# Patient Record
Sex: MALE | Race: WHITE | Employment: FULL TIME | URBAN - METROPOLITAN AREA
[De-identification: names, ages, dates, MRNs, and addresses within clinical notes are randomized per-mention and may not be internally consistent; named-entity substitution may affect disease eponyms.]

---

## 2019-01-21 ENCOUNTER — HOSPITAL ENCOUNTER (EMERGENCY)
Age: 24
Discharge: HOME | End: 2019-01-21

## 2019-01-21 VITALS
HEART RATE: 75 BPM | DIASTOLIC BLOOD PRESSURE: 72 MMHG | RESPIRATION RATE: 12 BRPM | BODY MASS INDEX: 26.7 KG/M2 | TEMPERATURE: 98.1 F | HEIGHT: 70 IN | WEIGHT: 186.51 LBS | OXYGEN SATURATION: 97 % | SYSTOLIC BLOOD PRESSURE: 117 MMHG

## 2019-01-21 DIAGNOSIS — H91.92 HEARING LOSS OF LEFT EAR, UNSPECIFIED HEARING LOSS TYPE: Primary | ICD-10-CM

## 2019-01-21 PROCEDURE — 99283 EMERGENCY DEPT VISIT LOW MDM: CPT

## 2021-04-24 ENCOUNTER — HOSPITAL ENCOUNTER (EMERGENCY)
Age: 26
Discharge: LEFT BEFORE DISCHARGE | End: 2021-04-24

## 2021-04-24 VITALS
HEART RATE: 73 BPM | SYSTOLIC BLOOD PRESSURE: 119 MMHG | TEMPERATURE: 97.1 F | RESPIRATION RATE: 18 BRPM | OXYGEN SATURATION: 98 % | DIASTOLIC BLOOD PRESSURE: 71 MMHG

## 2021-04-24 ASSESSMENT — PAIN DESCRIPTION - PAIN TYPE: TYPE: ACUTE PAIN

## 2021-04-24 ASSESSMENT — PAIN DESCRIPTION - LOCATION: LOCATION: LEG

## 2021-11-03 ENCOUNTER — HOSPITAL ENCOUNTER (EMERGENCY)
Age: 26
Discharge: HOME | End: 2021-11-03
Payer: COMMERCIAL

## 2021-11-03 VITALS
HEART RATE: 65 BPM | OXYGEN SATURATION: 99 % | HEIGHT: 70 IN | TEMPERATURE: 98.4 F | RESPIRATION RATE: 16 BRPM | BODY MASS INDEX: 29.19 KG/M2 | SYSTOLIC BLOOD PRESSURE: 115 MMHG | DIASTOLIC BLOOD PRESSURE: 77 MMHG | WEIGHT: 203.93 LBS

## 2021-11-03 DIAGNOSIS — R11.2 NON-INTRACTABLE VOMITING WITH NAUSEA, UNSPECIFIED VOMITING TYPE: Primary | ICD-10-CM

## 2021-11-03 LAB
A/G RATIO: 1.5 (ref 1.1–2.2)
ALBUMIN SERPL-MCNC: 4.9 G/DL (ref 3.4–5)
ALP BLD-CCNC: 74 U/L (ref 40–129)
ALT SERPL-CCNC: 19 U/L (ref 10–40)
ANION GAP SERPL CALCULATED.3IONS-SCNC: 13 MMOL/L (ref 3–16)
AST SERPL-CCNC: 17 U/L (ref 15–37)
BASOPHILS ABSOLUTE: 0.1 K/UL (ref 0–0.2)
BASOPHILS RELATIVE PERCENT: 0.7 %
BILIRUB SERPL-MCNC: 0.6 MG/DL (ref 0–1)
BILIRUBIN URINE: NEGATIVE
BLOOD, URINE: NEGATIVE
BUN BLDV-MCNC: 15 MG/DL (ref 7–20)
CALCIUM SERPL-MCNC: 9.7 MG/DL (ref 8.3–10.6)
CHLORIDE BLD-SCNC: 101 MMOL/L (ref 99–110)
CLARITY: CLEAR
CO2: 23 MMOL/L (ref 21–32)
COLOR: YELLOW
CREAT SERPL-MCNC: 1.1 MG/DL (ref 0.9–1.3)
EOSINOPHILS ABSOLUTE: 0.1 K/UL (ref 0–0.6)
EOSINOPHILS RELATIVE PERCENT: 1.9 %
GFR AFRICAN AMERICAN: >60
GFR NON-AFRICAN AMERICAN: >60
GLUCOSE BLD-MCNC: 91 MG/DL (ref 70–99)
GLUCOSE URINE: NEGATIVE MG/DL
HCT VFR BLD CALC: 49.7 % (ref 40.5–52.5)
HEMOGLOBIN: 16.3 G/DL (ref 13.5–17.5)
KETONES, URINE: NEGATIVE MG/DL
LEUKOCYTE ESTERASE, URINE: NEGATIVE
LIPASE: 27 U/L (ref 13–60)
LYMPHOCYTES ABSOLUTE: 2.1 K/UL (ref 1–5.1)
LYMPHOCYTES RELATIVE PERCENT: 26.9 %
MCH RBC QN AUTO: 28.2 PG (ref 26–34)
MCHC RBC AUTO-ENTMCNC: 32.8 G/DL (ref 31–36)
MCV RBC AUTO: 85.7 FL (ref 80–100)
MICROSCOPIC EXAMINATION: NORMAL
MONOCYTES ABSOLUTE: 0.5 K/UL (ref 0–1.3)
MONOCYTES RELATIVE PERCENT: 5.8 %
NEUTROPHILS ABSOLUTE: 5.2 K/UL (ref 1.7–7.7)
NEUTROPHILS RELATIVE PERCENT: 64.7 %
NITRITE, URINE: NEGATIVE
PDW BLD-RTO: 13.2 % (ref 12.4–15.4)
PH UA: 5.5 (ref 5–8)
PLATELET # BLD: 263 K/UL (ref 135–450)
PMV BLD AUTO: 7.5 FL (ref 5–10.5)
POTASSIUM SERPL-SCNC: 4.3 MMOL/L (ref 3.5–5.1)
PROTEIN UA: NEGATIVE MG/DL
RBC # BLD: 5.79 M/UL (ref 4.2–5.9)
SODIUM BLD-SCNC: 137 MMOL/L (ref 136–145)
SPECIFIC GRAVITY UA: 1.02 (ref 1–1.03)
TOTAL PROTEIN: 8.1 G/DL (ref 6.4–8.2)
URINE REFLEX TO CULTURE: NORMAL
URINE TYPE: NORMAL
UROBILINOGEN, URINE: 0.2 E.U./DL
WBC # BLD: 8 K/UL (ref 4–11)

## 2021-11-03 PROCEDURE — 80053 COMPREHEN METABOLIC PANEL: CPT

## 2021-11-03 PROCEDURE — 99284 EMERGENCY DEPT VISIT MOD MDM: CPT

## 2021-11-03 PROCEDURE — 6370000000 HC RX 637 (ALT 250 FOR IP): Performed by: PHYSICIAN ASSISTANT

## 2021-11-03 PROCEDURE — 36415 COLL VENOUS BLD VENIPUNCTURE: CPT

## 2021-11-03 PROCEDURE — 81003 URINALYSIS AUTO W/O SCOPE: CPT

## 2021-11-03 PROCEDURE — 83690 ASSAY OF LIPASE: CPT

## 2021-11-03 PROCEDURE — 85025 COMPLETE CBC W/AUTO DIFF WBC: CPT

## 2021-11-03 RX ORDER — ONDANSETRON 4 MG/1
8 TABLET, ORALLY DISINTEGRATING ORAL ONCE
Status: COMPLETED | OUTPATIENT
Start: 2021-11-03 | End: 2021-11-03

## 2021-11-03 RX ORDER — PROMETHAZINE HYDROCHLORIDE 25 MG/1
25 TABLET ORAL EVERY 6 HOURS PRN
Qty: 15 TABLET | Refills: 0 | Status: SHIPPED | OUTPATIENT
Start: 2021-11-03 | End: 2021-12-26

## 2021-11-03 RX ADMIN — ONDANSETRON 8 MG: 4 TABLET, ORALLY DISINTEGRATING ORAL at 11:57

## 2021-11-03 NOTE — ED PROVIDER NOTES
[address] [facility]        Pt Name: Glenna Baig  MRN: [id number]  [place] 1995  Date of evaluation: 11/3/2021  Provider: ASHLEY Villavicencio    This patient was not seen and evaluated by the attending physician No att. providers found. CHIEF COMPLAINT     Nausea, vomiting      HISTORY OFPRESENT ILLNESS  (Location/Symptom, Timing/Onset, Context/Setting, Quality, Duration, Modifying Factors, Severity.)   Glenna Baig is a 23 y.o. male who presents to the emergency department for nausea and vomiting. Reports that started 2 days ago. He went to urgent care 2 days ago and given Zofran, which is not really helping. He reports today he was at work and had an episode of nausea and dry heaving. Denies any hematemesis or coffee-ground emesis. Has not taken any Zofran today yet. Denies abdominal pain. There is a little bit of diarrhea. No bright red blood per rectum or black tarry stool. Denies sick contacts. Denies prior episodes. Denies fever, cough, congestion rhinorrhea. No hx abd surgery. Nursing Notes were reviewed and I agree. REVIEW OF SYSTEMS    (2-9 systems for level 4, 10 or more for level 5)     Review of Systems   Constitutional: Negative for fever. HENT: Negative for congestion and rhinorrhea. Respiratory: Negative for cough. Gastrointestinal: Positive for diarrhea, nausea and vomiting. Negative for abdominal pain, anal bleeding, blood in stool and constipation. Except as noted above the remainder ofthe review of systems was reviewed and negative.        PAST MEDICALHISTORY         Diagnosis Date    Pectus excavatum        SURGICAL HISTORY           Procedure Laterality Date    MASTECTOMY         CURRENT MEDICATIONS       Discharge Medication List as of 11/3/2021 12:02 PM      CONTINUE these medications which have NOT CHANGED    Details   fluticasone (FLONASE) 50 MCG/ACT nasal spray 1 spray by Nasal route daily, Disp-1 Bottle, R-0             ALLERGIES     Augmentin [amoxicillin-pot clavulanate], Cefzil [cefprozil], Erythromycin, and Vancomycin    FAMILY HISTORY     History reviewed. No pertinent family history. No family status information on file. SOCIAL HISTORY      reports that he has never smoked. He does not have any smokeless tobacco history on file. He reports that he does not drink alcohol and does not use drugs. EXAM    (up to 7 for level 4, 8 or more for level 5)     ED Triage Vitals [11/03/21 0954]   BP Temp Temp Source Pulse Resp SpO2 Height Weight   120/82 96.9 °F (36.1 °C) Temporal 89 17 97 % 5' 10\" (1.778 m) 203 lb 14.8 oz (92.5 kg)       Physical Exam  Constitutional:       General: He is not in acute distress. Appearance: Normal appearance. He is well-developed. He is not ill-appearing, toxic-appearing or diaphoretic. HENT:      Head: Normocephalic and atraumatic. Cardiovascular:      Rate and Rhythm: Normal rate and regular rhythm. Heart sounds: Normal heart sounds. Pulmonary:      Effort: Pulmonary effort is normal. No respiratory distress. Breath sounds: Normal breath sounds. Abdominal:      General: Bowel sounds are normal. There is no distension. Palpations: Abdomen is soft. There is no mass. Tenderness: There is no abdominal tenderness. There is no guarding or rebound. Hernia: No hernia is present. Musculoskeletal:         General: Normal range of motion. Cervical back: Normal range of motion and neck supple. Skin:     General: Skin is warm. Neurological:      Mental Status: He is alert. Psychiatric:         Mood and Affect: Mood normal.         Behavior: Behavior normal.         Thought Content:  Thought content normal.         Judgment: Judgment normal.         DIFFERENTIAL DIAGNOSIS   Appendicitis, Small Bowel Obstruction, Pancreatitis, Cholesystitis, Hepatitis, Aortic Dissection, DKA, Pylonephritis, Kidney Stone, Diverticulitis    DIAGNOSTIC RESULTS     RADIOLOGY:   Non-plain film images such as CT, Ultrasound and MRI are read by the radiologist. Plain radiographic images are visualized and preliminarily interpreted ASHLEY Quintana with the below findings:      Interpretation per the Radiologist below, if available at the time of this note:    No orders to display         LABS:  Results for orders placed or performed during the hospital encounter of 11/03/21   CBC Auto Differential   Result Value Ref Range    WBC 8.0 4.0 - 11.0 K/uL    RBC 5.79 4.20 - 5.90 M/uL    Hemoglobin 16.3 13.5 - 17.5 g/dL    Hematocrit 49.7 40.5 - 52.5 %    MCV 85.7 80.0 - 100.0 fL    MCH 28.2 26.0 - 34.0 pg    MCHC 32.8 31.0 - 36.0 g/dL    RDW 13.2 12.4 - 15.4 %    Platelets 590 825 - 378 K/uL    MPV 7.5 5.0 - 10.5 fL    Neutrophils % 64.7 %    Lymphocytes % 26.9 %    Monocytes % 5.8 %    Eosinophils % 1.9 %    Basophils % 0.7 %    Neutrophils Absolute 5.2 1.7 - 7.7 K/uL    Lymphocytes Absolute 2.1 1.0 - 5.1 K/uL    Monocytes Absolute 0.5 0.0 - 1.3 K/uL    Eosinophils Absolute 0.1 0.0 - 0.6 K/uL    Basophils Absolute 0.1 0.0 - 0.2 K/uL   Lipase   Result Value Ref Range    Lipase 27.0 13.0 - 60.0 U/L   Comprehensive Metabolic Panel   Result Value Ref Range    Sodium 137 136 - 145 mmol/L    Potassium 4.3 3.5 - 5.1 mmol/L    Chloride 101 99 - 110 mmol/L    CO2 23 21 - 32 mmol/L    Anion Gap 13 3 - 16    Glucose 91 70 - 99 mg/dL    BUN 15 7 - 20 mg/dL    CREATININE 1.1 0.9 - 1.3 mg/dL    GFR Non-African American >60 >60    GFR African American >60 >60    Calcium 9.7 8.3 - 10.6 mg/dL    Total Protein 8.1 6.4 - 8.2 g/dL    Albumin 4.9 3.4 - 5.0 g/dL    Albumin/Globulin Ratio 1.5 1.1 - 2.2    Total Bilirubin 0.6 0.0 - 1.0 mg/dL    Alkaline Phosphatase 74 40 - 129 U/L    ALT 19 10 - 40 U/L    AST 17 15 - 37 U/L   Urinalysis Reflex to Culture   Result Value Ref Range    Color, UA YELLOW Straw/Yellow    Clarity, UA Clear Clear    Glucose, Ur Negative Negative mg/dL    Bilirubin Urine Negative Negative    Ketones, Urine Negative Negative mg/dL    Specific Gravity, UA 1.019 1.005 - 1.030    Blood, Urine Negative Negative    pH, UA 5.5 5.0 - 8.0    Protein, UA Negative Negative mg/dL    Urobilinogen, Urine 0.2 <2.0 E.U./dL    Nitrite, Urine Negative Negative    Leukocyte Esterase, Urine Negative Negative    Microscopic Examination Not Indicated     Urine Type NotGiven     Urine Reflex to Culture Not Indicated        All other labs were within normal range or not returned as of this dictation. EMERGENCY DEPARTMENT COURSE and DIFFERENTIALDIAGNOSIS/MDM:   Vitals:    Vitals:    11/03/21 0954 11/03/21 1200   BP: 120/82 115/77   Pulse: 89 65   Resp: 17 16   Temp: 96.9 °F (36.1 °C) 98.4 °F (36.9 °C)   TempSrc: Temporal Oral   SpO2: 97% 99%   Weight: 203 lb 14.8 oz (92.5 kg)    Height: 5' 10\" (1.778 m)        Patient wasnontoxic, well appearing, afebrile with normal vital signs. Patient is saturate well on room air. Denies abdominal pain and abdomen is soft nontender on exam.  White count normal.  Renal function and electrolytes normal.  Lipase normal.  Urine negative. Given Zofran here. Appears well. Low suspicion for acute intraabdominal abnormality and do not believe any further workup is indicated. Believe appropriate for outpatient management. Will dc with phenergan. Instructed to FU with PCP in next few days for reeval and to return for worsening. He agreed and understood. .      I estimate there is LOW risk for ACUTE APPENDICITIS, BOWEL OBSTRUCTION, CHOLECYSTITIS, DIVERTICULITIS, INCARCERATED HERNIA, PANCREATITIS, or PERFORATED BOWEL or ULCER, thus I consider thedischarge disposition reasonable. Also, there is no evidence or peritonitis, sepsis, or toxicity. PROCEDURES:  None    FINAL IMPRESSION      1.  Non-intractable vomiting with nausea, unspecified vomiting type          DISPOSITION/PLAN   DISPOSITION Decision To Discharge 11/03/2021 12:27:14 PM      PATIENT REFERRED TO:  Jason Mariee  [phone]  Schedule an appointment as soon as possible for a visit in 2 days  for reevaluation    [facility] Emergency Department  [address] [facility]  [phone]    As needed, If symptoms worsen, for worsening pain, nausea, vomiting or if fever or for any other concerns      DISCHARGE MEDICATIONS:  Discharge Medication List as of 11/3/2021 12:02 PM      START taking these medications    Details   promethazine (PHENERGAN) 25 MG tablet Take 1 tablet by mouth every 6 hours as needed for Nausea, Disp-15 tablet, R-0Normal             (Please note that portionsof this note were completed with a voice recognition program.  Efforts were made to edit the dictations but occasionally words are mis-transcribed.)    Steve Duarte, [address] [address]  11/04/21 3560

## 2021-11-03 NOTE — ED NOTES
Discharge and education instructions reviewed. Patient verbalized understanding, teach-back successful. Patient denied questions at this time. No acute distress noted. Patient instructed to follow-up as noted - return to emergency department if symptoms worsen. Patient verbalized understanding. Discharged per EDMD with discharged instructions.        Gaby Little RN  11/03/21 3373

## 2021-11-04 ASSESSMENT — ENCOUNTER SYMPTOMS
CONSTIPATION: 0
COUGH: 0
VOMITING: 1
ANAL BLEEDING: 0
BLOOD IN STOOL: 0
DIARRHEA: 1
ABDOMINAL PAIN: 0
NAUSEA: 1
RHINORRHEA: 0

## 2021-12-26 ENCOUNTER — HOSPITAL ENCOUNTER (EMERGENCY)
Age: 26
Discharge: HOME | End: 2021-12-26
Admitting: PHYSICIAN ASSISTANT
Payer: COMMERCIAL

## 2021-12-26 ENCOUNTER — APPOINTMENT (OUTPATIENT)
Dept: CT IMAGING | Age: 26
End: 2021-12-26
Payer: COMMERCIAL

## 2021-12-26 VITALS
WEIGHT: 214.07 LBS | RESPIRATION RATE: 21 BRPM | DIASTOLIC BLOOD PRESSURE: 74 MMHG | OXYGEN SATURATION: 98 % | BODY MASS INDEX: 30.72 KG/M2 | TEMPERATURE: 100 F | HEART RATE: 90 BPM | SYSTOLIC BLOOD PRESSURE: 124 MMHG

## 2021-12-26 DIAGNOSIS — R19.7 NAUSEA VOMITING AND DIARRHEA: Primary | ICD-10-CM

## 2021-12-26 DIAGNOSIS — R11.2 NAUSEA VOMITING AND DIARRHEA: Primary | ICD-10-CM

## 2021-12-26 DIAGNOSIS — J21.9 ACUTE BRONCHIOLITIS DUE TO UNSPECIFIED ORGANISM: ICD-10-CM

## 2021-12-26 LAB
A/G RATIO: 1.5 (ref 1.1–2.2)
ALBUMIN SERPL-MCNC: 4.8 G/DL (ref 3.4–5)
ALP BLD-CCNC: 81 U/L (ref 40–129)
ALT SERPL-CCNC: 23 U/L (ref 10–40)
ANION GAP SERPL CALCULATED.3IONS-SCNC: 16 MMOL/L (ref 3–16)
AST SERPL-CCNC: 16 U/L (ref 15–37)
BASOPHILS ABSOLUTE: 0 K/UL (ref 0–0.2)
BASOPHILS RELATIVE PERCENT: 0.2 %
BILIRUB SERPL-MCNC: 0.9 MG/DL (ref 0–1)
BUN BLDV-MCNC: 16 MG/DL (ref 7–20)
C DIFF TOXIN/ANTIGEN: NORMAL
CALCIUM SERPL-MCNC: 9.1 MG/DL (ref 8.3–10.6)
CHLORIDE BLD-SCNC: 101 MMOL/L (ref 99–110)
CO2: 24 MMOL/L (ref 21–32)
CREAT SERPL-MCNC: 1.2 MG/DL (ref 0.9–1.3)
EOSINOPHILS ABSOLUTE: 0 K/UL (ref 0–0.6)
EOSINOPHILS RELATIVE PERCENT: 0.1 %
GFR AFRICAN AMERICAN: >60
GFR NON-AFRICAN AMERICAN: >60
GLUCOSE BLD-MCNC: 114 MG/DL (ref 70–99)
HCT VFR BLD CALC: 47.4 % (ref 40.5–52.5)
HEMOGLOBIN: 16.3 G/DL (ref 13.5–17.5)
LIPASE: 19 U/L (ref 13–60)
LYMPHOCYTES ABSOLUTE: 0.5 K/UL (ref 1–5.1)
LYMPHOCYTES RELATIVE PERCENT: 5 %
MCH RBC QN AUTO: 29.5 PG (ref 26–34)
MCHC RBC AUTO-ENTMCNC: 34.5 G/DL (ref 31–36)
MCV RBC AUTO: 85.6 FL (ref 80–100)
MONOCYTES ABSOLUTE: 0.6 K/UL (ref 0–1.3)
MONOCYTES RELATIVE PERCENT: 5.7 %
NEUTROPHILS ABSOLUTE: 8.6 K/UL (ref 1.7–7.7)
NEUTROPHILS RELATIVE PERCENT: 89 %
PDW BLD-RTO: 13.7 % (ref 12.4–15.4)
PLATELET # BLD: 241 K/UL (ref 135–450)
PMV BLD AUTO: 7.3 FL (ref 5–10.5)
POTASSIUM SERPL-SCNC: 4 MMOL/L (ref 3.5–5.1)
RAPID INFLUENZA  B AGN: NEGATIVE
RAPID INFLUENZA A AGN: NEGATIVE
RBC # BLD: 5.53 M/UL (ref 4.2–5.9)
SARS-COV-2, NAAT: NOT DETECTED
SODIUM BLD-SCNC: 141 MMOL/L (ref 136–145)
TOTAL PROTEIN: 8 G/DL (ref 6.4–8.2)
WBC # BLD: 9.7 K/UL (ref 4–11)

## 2021-12-26 PROCEDURE — 74177 CT ABD & PELVIS W/CONTRAST: CPT

## 2021-12-26 PROCEDURE — 6360000004 HC RX CONTRAST MEDICATION: Performed by: PHYSICIAN ASSISTANT

## 2021-12-26 PROCEDURE — 87804 INFLUENZA ASSAY W/OPTIC: CPT

## 2021-12-26 PROCEDURE — 83690 ASSAY OF LIPASE: CPT

## 2021-12-26 PROCEDURE — 87324 CLOSTRIDIUM AG IA: CPT

## 2021-12-26 PROCEDURE — 99285 EMERGENCY DEPT VISIT HI MDM: CPT

## 2021-12-26 PROCEDURE — 96374 THER/PROPH/DIAG INJ IV PUSH: CPT

## 2021-12-26 PROCEDURE — 6370000000 HC RX 637 (ALT 250 FOR IP): Performed by: PHYSICIAN ASSISTANT

## 2021-12-26 PROCEDURE — 2580000003 HC RX 258: Performed by: PHYSICIAN ASSISTANT

## 2021-12-26 PROCEDURE — 87635 SARS-COV-2 COVID-19 AMP PRB: CPT

## 2021-12-26 PROCEDURE — 80053 COMPREHEN METABOLIC PANEL: CPT

## 2021-12-26 PROCEDURE — 36415 COLL VENOUS BLD VENIPUNCTURE: CPT

## 2021-12-26 PROCEDURE — 96375 TX/PRO/DX INJ NEW DRUG ADDON: CPT

## 2021-12-26 PROCEDURE — 85025 COMPLETE CBC W/AUTO DIFF WBC: CPT

## 2021-12-26 PROCEDURE — 87449 NOS EACH ORGANISM AG IA: CPT

## 2021-12-26 PROCEDURE — 6360000002 HC RX W HCPCS: Performed by: PHYSICIAN ASSISTANT

## 2021-12-26 RX ORDER — ONDANSETRON 4 MG/1
4 TABLET, ORALLY DISINTEGRATING ORAL EVERY 8 HOURS PRN
Qty: 12 TABLET | Refills: 0 | Status: SHIPPED | OUTPATIENT
Start: 2021-12-26

## 2021-12-26 RX ORDER — KETOROLAC TROMETHAMINE 15 MG/ML
15 INJECTION, SOLUTION INTRAMUSCULAR; INTRAVENOUS ONCE
Status: COMPLETED | OUTPATIENT
Start: 2021-12-26 | End: 2021-12-26

## 2021-12-26 RX ORDER — 0.9 % SODIUM CHLORIDE 0.9 %
1000 INTRAVENOUS SOLUTION INTRAVENOUS ONCE
Status: COMPLETED | OUTPATIENT
Start: 2021-12-26 | End: 2021-12-26

## 2021-12-26 RX ORDER — ONDANSETRON 2 MG/ML
4 INJECTION INTRAMUSCULAR; INTRAVENOUS ONCE
Status: COMPLETED | OUTPATIENT
Start: 2021-12-26 | End: 2021-12-26

## 2021-12-26 RX ORDER — DIPHENHYDRAMINE HYDROCHLORIDE 50 MG/ML
12.5 INJECTION INTRAMUSCULAR; INTRAVENOUS ONCE
Status: COMPLETED | OUTPATIENT
Start: 2021-12-26 | End: 2021-12-26

## 2021-12-26 RX ORDER — ACETAMINOPHEN 325 MG/1
650 TABLET ORAL ONCE
Status: COMPLETED | OUTPATIENT
Start: 2021-12-26 | End: 2021-12-26

## 2021-12-26 RX ORDER — METOCLOPRAMIDE HYDROCHLORIDE 5 MG/ML
5 INJECTION INTRAMUSCULAR; INTRAVENOUS ONCE
Status: COMPLETED | OUTPATIENT
Start: 2021-12-26 | End: 2021-12-26

## 2021-12-26 RX ORDER — DICYCLOMINE HYDROCHLORIDE 10 MG/1
10 CAPSULE ORAL 4 TIMES DAILY
Qty: 12 CAPSULE | Refills: 0 | Status: SHIPPED | OUTPATIENT
Start: 2021-12-26 | End: 2021-12-29

## 2021-12-26 RX ADMIN — SODIUM CHLORIDE 1000 ML: 9 INJECTION, SOLUTION INTRAVENOUS at 18:44

## 2021-12-26 RX ADMIN — ONDANSETRON 4 MG: 2 INJECTION INTRAMUSCULAR; INTRAVENOUS at 18:44

## 2021-12-26 RX ADMIN — DIPHENHYDRAMINE HYDROCHLORIDE 12.5 MG: 50 INJECTION, SOLUTION INTRAMUSCULAR; INTRAVENOUS at 18:44

## 2021-12-26 RX ADMIN — IOPAMIDOL 75 ML: 755 INJECTION, SOLUTION INTRAVENOUS at 20:40

## 2021-12-26 RX ADMIN — ACETAMINOPHEN 650 MG: 325 TABLET ORAL at 19:55

## 2021-12-26 RX ADMIN — KETOROLAC TROMETHAMINE 15 MG: 15 INJECTION, SOLUTION INTRAMUSCULAR; INTRAVENOUS at 19:54

## 2021-12-26 RX ADMIN — SODIUM CHLORIDE 1000 ML: 9 INJECTION, SOLUTION INTRAVENOUS at 19:54

## 2021-12-26 RX ADMIN — METOCLOPRAMIDE HYDROCHLORIDE 5 MG: 5 INJECTION INTRAMUSCULAR; INTRAVENOUS at 18:44

## 2021-12-26 ASSESSMENT — PAIN DESCRIPTION - DESCRIPTORS: DESCRIPTORS: ACHING

## 2021-12-26 ASSESSMENT — PAIN DESCRIPTION - PAIN TYPE: TYPE: ACUTE PAIN

## 2021-12-26 ASSESSMENT — PAIN SCALES - GENERAL
PAINLEVEL_OUTOF10: 2
PAINLEVEL_OUTOF10: 3
PAINLEVEL_OUTOF10: 0
PAINLEVEL_OUTOF10: 3

## 2021-12-26 ASSESSMENT — PAIN DESCRIPTION - LOCATION: LOCATION: GENERALIZED

## 2021-12-26 NOTE — LETTER
[facility] Emergency Department  [address] [facility] [address]  Phone: [phone]               December 26, 2021    Patient: Sacha Sahu   YOB: 1995   Date of Visit: 12/26/2021       To Whom It May Concern:    Sacha Sahu was seen and treated in our emergency department on 12/26/2021. He may return to work on 12/29/21.       Sincerely,       ASHLEY Melendez         Signature:__________________________________

## 2021-12-27 ENCOUNTER — CARE COORDINATION (OUTPATIENT)
Dept: OTHER | Facility: CLINIC | Age: 26
End: 2021-12-27

## 2021-12-27 ASSESSMENT — ENCOUNTER SYMPTOMS
DIARRHEA: 1
COLOR CHANGE: 0
ABDOMINAL PAIN: 1
SHORTNESS OF BREATH: 0
COUGH: 0
NAUSEA: 1
VOMITING: 1

## 2021-12-27 NOTE — CARE COORDINATION
Ambulatory Care Coordination Note  12/27/2021  CM Risk Score: 4  Charlson 10 Year Mortality Risk Score: 2%     ACC: Aida Shelley LPN    ACM spoke to patient, intro the ACM and ER f/u. Patient states he continues to have watery, non-bloody, diarrhea and intermittent nausea. He states, so far the diarrhea episode are less frequent. He states he does still feel weak and fatigued. Afebrile. He is staying well hydrated, urinating normally. He kept down a peanut butter and jelly sandwich this morning. ACM adv patient to ensure hydration and continue binding diet. He was unable to get Bentyl and Zofran rx yet, is going to pick them up soon. Patient does not have PCP- ACM offered to assist with this. He declined for now, he said he will call one of the names on the list provided to him from the ER. He will contact me if he needs assistance. ACM education him on the importance of having a PCP and utilizing PCP for illnesses and regular health maintenance. Discussed NAL- # provided to patient. ACM discussed the following RED FLAGS and encouraged patient to contact 911 for life threatening emergencies and PCP office 24/7 for non life-threatening symptoms. ACM also encouraged outreach to Nurse Access Line and/or ACM for assessment and intervention guidance as needed. Reminded patient of alternatives to ED such as urgent care, walk in clinics and e-visits as available. Ambulatory Care Coordination Assessment    Care Coordination Protocol  Program Enrollment: Complex Care  Referral from Primary Care Provider: No  Week 1 - Initial Assessment                             Suggested Interventions and Community Resources                  Prior to Admission medications    Medication Sig Start Date End Date Taking?  Authorizing Provider   dicyclomine (BENTYL) 10 MG capsule Take 1 capsule by mouth 4 times daily for 12 doses 12/26/21 12/29/21 Yes ASHLEY Walton   ondansetron (ZOFRAN ODT) 4 MG disintegrating tablet Take 1 tablet by mouth every 8 hours as needed for Nausea or Vomiting Let dissolve in mouth. 12/26/21  Yes ASHLEY Weiner       No future appointments. Carolina Rodriguez [facility], [address] Coordinator  [facility]  [address] [place] [address], [address] [place] [address]  Phone: [phone]  [email or web address]

## 2021-12-27 NOTE — ED NOTES
.Pt discharged at this time. Discharge instructions and medications reviewed,  Questions were answered. PT verbalized understanding. .  Follow up appointments were discussed.          MADHU Johnston  12/26/21 3071

## 2021-12-27 NOTE — ED PROVIDER NOTES
[address] [facility]      Pt Name: Carla Steele  MRN: [id number]  [place] 1995  Date of evaluation: 12/26/2021  Provider: ASHLEY Choudhary    This patient was not seen and evaluated by the attending physician No att. providers found. CHIEF COMPLAINT       Chief Complaint   Patient presents with    Nausea    Emesis    Diarrhea       CRITICAL CARE TIME   I performed a total Critical Care time of 15 minutes, excluding separately reportable procedures. There was a high probability of clinically significant/life threatening deterioration in the patient's condition which required my urgent intervention. Not limited to multiple reexaminations, discussions with attending physician and consultants. HISTORY OF PRESENT ILLNESS  (Location/Symptom, Timing/Onset, Context/Setting, Quality, Duration, Modifying Factors, Severity.)   Carla Steele is a 23 y.o. male who presents to the emergency department via EMS with nausea vomiting diarrhea. Symptoms started around 5:30 AM.  He reports approximately 12 episodes of nonbloody emesis and 20 episodes of nonbloody diarrhea. No recent antibiotic use. 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 He states that his grandmother was sick around [place] otherwise no known sick contacts. He has had no cough no chest pain no shortness of breath. No documented fevers but has had some \"hot flashes. \"  No prior abdominal surgeries. No alcohol use. Generalized pain 2 out of 10 with some generalized abdominal pain. Nursing Notes were reviewed and I agree. REVIEW OF SYSTEMS    (2-9 systems for level 4, 10 or more for level 5)     Review of Systems   Constitutional: Positive for chills. Negative for fever. Respiratory: Negative for cough and shortness of breath. Cardiovascular: Negative for chest pain. Gastrointestinal: Positive for abdominal pain, diarrhea, nausea and vomiting. Musculoskeletal: Positive for myalgias. Negative for neck pain and neck stiffness. Skin: Negative for color change, rash and wound. Neurological: Negative for weakness and numbness. Psychiatric/Behavioral: Negative for agitation and confusion. Except as noted above the remainder of the review of systems was reviewed and negative. PAST MEDICAL HISTORY         Diagnosis Date    Pectus excavatum        SURGICAL HISTORY           Procedure Laterality Date    MASTECTOMY         CURRENT MEDICATIONS       Discharge Medication List as of 12/26/2021 10:27 PM          ALLERGIES     Augmentin [amoxicillin-pot clavulanate], Azithromycin, Cefzil [cefprozil], Erythromycin, and Vancomycin    FAMILY HISTORY     History reviewed. No pertinent family history. No family status information on file. SOCIAL HISTORY      reports that he has never smoked. He has never used smokeless tobacco. He reports that he does not drink alcohol and does not use drugs. PHYSICAL EXAM    (up to 7 for level 4, 8 or more for level 5)     ED Triage Vitals [12/26/21 1829]   BP Temp Temp Source Pulse Resp SpO2 Height Weight   122/83 101.2 °F (38.4 °C) Oral 96 18 95 % -- 214 lb 1.1 oz (97.1 kg)       Physical Exam  Vitals and nursing note reviewed. Constitutional:       Appearance: Normal appearance. HENT:      Head: Normocephalic and atraumatic. Eyes:      Pupils: Pupils are equal, round, and reactive to light. Cardiovascular:      Rate and Rhythm: Normal rate. Pulses: Normal pulses. Pulmonary:      Effort: Pulmonary effort is normal. No respiratory distress. Breath sounds: No wheezing. Abdominal:      Tenderness: There is abdominal tenderness. There is no guarding or rebound. Musculoskeletal:         General: No swelling. Normal range of motion. Skin:     General: Skin is warm. Neurological:      General: No focal deficit present.       Mental Status: He is alert and oriented to person, place, and time.   Psychiatric:         Mood and Affect: Mood normal.         Behavior: Behavior normal.         DIAGNOSTIC RESULTS     RADIOLOGY:   Non-plain film images such as CT, Ultrasound and MRI are read by the radiologist. Plain radiographic images are visualized and preliminarily interpreted by ASHLEY Alston with the below findings:    Reviewed radiologist's interpretation. Interpretation per the Radiologist below, if available at the time of this note:    CT ABDOMEN PELVIS W IV CONTRAST Additional Contrast? None   Final Result   1. Liquid stool throughout the colon and rectum that can be seen with   diarrhea, potentially due to mild enteritis given fluid distention of   multiple small bowel loops. 2. Minimal infectious or inflammatory bronchiolitis in the right middle lobe.                LABS:  Labs Reviewed   CBC WITH AUTO DIFFERENTIAL - Abnormal; Notable for the following components:       Result Value    Neutrophils Absolute 8.6 (*)     Lymphocytes Absolute 0.5 (*)     All other components within normal limits    Narrative:     Performed at:  [facility]  [address] [place], [place] [address]   Phone [phone]   COMPREHENSIVE METABOLIC PANEL - Abnormal; Notable for the following components:    Glucose 114 (*)     All other components within normal limits    Narrative:     Performed at:  [facility]  [address] [place], [place] [address]   Phone [phone]   RAPID INFLUENZA A/B ANTIGENS    Narrative:     Performed at:  [facility]  [address] [place], [place] [address]   Phone [phone] RAPID    Narrative:     Performed at:  [facility]  [address] [place], [place] [address]   Phone [phone]   C DIFF TOXIN/ANTIGEN    Narrative:     ORDER#: [id number]                          ORDERED BY: Cindy Harris  SOURCE: Stool COLLECTED:  12/26/21 20:00  ANTIBIOTICS AT JAIR.:                      RECEIVED :  12/26/21 20:18  Collect White vial (sterile container)  Performed at:  [facility]  [address] [place] [facility] [address]   Phone [phone]   GASTROINTESTINAL PANEL, MOLECULAR   LIPASE    Narrative:     Performed at:  [facility]  [address] [place] [facility] [address]   Phone [phone]   URINE RT REFLEX TO CULTURE       All other labs were within normal range or not returned as of this dictation. EMERGENCY DEPARTMENT COURSE and DIFFERENTIAL DIAGNOSIS/MDM:   Vitals:    Vitals:    12/26/21 2145 12/26/21 2200 12/26/21 2215 12/26/21 2240   BP: 121/68 119/74 119/70 124/74   Pulse: 94 90 85 90   Resp: 21 21 20 21   Temp:    100 °F (37.8 °C)   TempSrc:    Oral   SpO2: 96% 97% 97% 98%   Weight:         I discussed with Yaritza Ennis and/or family the exam results, diagnosis, care, prognosis, reasons to return and the importance of follow up. Patient and/or family is in full agreement with plan and all questions have been answered. Specific discharge instructions explained, including reasons to return to the emergency department. Yaritza Ennis is well appearing, non-toxic, and afebrile at the time of discharge. Patient is some body aches abdominal pain with vomiting and diarrhea. He is not tachycardic but he is febrile on arrival.  Temperature is coming down on recheck. He is not hypoxic he is denying cough or shortness of breath. Advised the findings of possible bronchiolitis likely viral.  No elevated white count. No productive cough. He is having diarrhea had an episode here negative for C. difficile. Will send for GI bacterial pathogens. He had no more vomiting and was feeling improved. Will discharge with instructions to drink plenty fluids rest and return for new, worsening or other concerns.     I estimate there is LOW risk for PNEUMONIA, MENINGITIS, PERITONSILLAR ABSCESS, SEPSIS, MALIGNANT OTITIS EXTERNA, OR EPIGLOTTITIS thus I consider the discharge disposition reasonable. I estimate there is LOW risk for ACUTE APPENDICITIS, BOWEL OBSTRUCTION, CHOLECYSTITIS, DIVERTICULITIS, INCARCERATED HERNIA, PANCREATITIS, PERFORATED BOWEL, BOWEL ISCHEMIA, GONADAL TORSION, OR CARDIAC ISCHEMIA, thus I consider the discharge disposition reasonable. Also, there is no evidence or peritonitis, sepsis, or toxicity. CONSULTS:  None    PROCEDURES:  Procedures      FINAL IMPRESSION      1. Nausea vomiting and diarrhea    2.  Acute bronchiolitis due to unspecified organism          DISPOSITION/PLAN   DISPOSITION Decision To Discharge 12/26/2021 10:06:35 PM      PATIENT REFERRED TO:  [facility] Pre-Services  [phone]          DISCHARGE MEDICATIONS:  Discharge Medication List as of 12/26/2021 10:27 PM      START taking these medications    Details   dicyclomine (BENTYL) 10 MG capsule Take 1 capsule by mouth 4 times daily for 12 doses, Disp-12 capsule, R-0Print      ondansetron (ZOFRAN ODT) 4 MG disintegrating tablet Take 1 tablet by mouth every 8 hours as needed for Nausea or Vomiting Let dissolve in mouth., Disp-12 tablet, R-0Print             (Please note that portions of this note were completed with a voice recognition program.  Efforts were made to edit the dictations but occasionally words are mis-transcribed.)    Leonarda Romeo, [address]  12/27/21 9257

## 2021-12-27 NOTE — ED NOTES
.Pt discharged at this time. Discharge instructions and medications reviewed,  Questions were answered. PT verbalized understanding. Follow up appointments were discussed.          [facility]  12/26/21 6436

## 2021-12-30 ENCOUNTER — CARE COORDINATION (OUTPATIENT)
Dept: OTHER | Facility: CLINIC | Age: 26
End: 2021-12-30

## 2022-01-10 ENCOUNTER — CARE COORDINATION (OUTPATIENT)
Dept: OTHER | Facility: CLINIC | Age: 27
End: 2022-01-10

## 2022-01-10 NOTE — CARE COORDINATION
Ambulatory Care Coordination Note  1/10/2022  CM Risk Score: 4  Charlson 10 Year Mortality Risk Score: 2%     ACC: Michael Fraser LPN    Spoke to patient, states he continues to search for a PCP. ACM offered assistance, he declined. ACM adv if he ever changes his mind to accept my help, he can contact me. No further outreach scheduled with this ACM, ACM will sign off care team at this time. Episode of Care resolved. Patient has this ACM's contact information if future needs arise. Care Coordination Interventions    Program Enrollment: Complex Care  Referral from Primary Care Provider: No  Suggested Interventions and Community Resources         Goals Addressed                 This Visit's Progress     COMPLETED: Conditions and Symptoms        I will schedule office visits, as directed by my provider. I will keep my appointment or reschedule if I have to cancel. I will notify my provider of any barriers to my plan of care. I will follow my Zone Management tool to seek urgent or emergent care. I will notify my provider of any symptoms that indicate a worsening of my condition. Barriers: overwhelmed by complexity of regimen and lack of education and lack of motivation  Plan for overcoming my barriers: I will work with my ACM to establish care with a new PCP and attend all follow up and routine appointments. Confidence: 6/10  Anticipated Goal Completion Date: 1/4/22              Prior to Admission medications    Medication Sig Start Date End Date Taking? Authorizing Provider   dicyclomine (BENTYL) 10 MG capsule Take 1 capsule by mouth 4 times daily for 12 doses 12/26/21 12/29/21  ASHLEY Jara   ondansetron (ZOFRAN ODT) 4 MG disintegrating tablet Take 1 tablet by mouth every 8 hours as needed for Nausea or Vomiting Let dissolve in mouth. 12/26/21   ASHLEY Jara       No future appointments. Carolina Werner LPN- Care Coordinator  [facility]  [address] [place] [address], [address]  Phone: [phone]  [email or web address]

## 2022-11-28 ENCOUNTER — OFFICE VISIT (OUTPATIENT)
Dept: FAMILY MEDICINE CLINIC | Age: 27
End: 2022-11-28
Payer: COMMERCIAL

## 2022-11-28 VITALS
HEART RATE: 77 BPM | BODY MASS INDEX: 30.52 KG/M2 | WEIGHT: 213.2 LBS | TEMPERATURE: 98.9 F | SYSTOLIC BLOOD PRESSURE: 102 MMHG | DIASTOLIC BLOOD PRESSURE: 78 MMHG | RESPIRATION RATE: 18 BRPM | HEIGHT: 70 IN | OXYGEN SATURATION: 97 %

## 2022-11-28 DIAGNOSIS — J02.9 SORE THROAT: Primary | ICD-10-CM

## 2022-11-28 LAB — S PYO AG THROAT QL: NORMAL

## 2022-11-28 PROCEDURE — 99202 OFFICE O/P NEW SF 15 MIN: CPT | Performed by: FAMILY MEDICINE

## 2022-11-28 PROCEDURE — 87880 STREP A ASSAY W/OPTIC: CPT | Performed by: FAMILY MEDICINE

## 2022-11-28 SDOH — ECONOMIC STABILITY: FOOD INSECURITY: WITHIN THE PAST 12 MONTHS, THE FOOD YOU BOUGHT JUST DIDN'T LAST AND YOU DIDN'T HAVE MONEY TO GET MORE.: NEVER TRUE

## 2022-11-28 SDOH — ECONOMIC STABILITY: FOOD INSECURITY: WITHIN THE PAST 12 MONTHS, YOU WORRIED THAT YOUR FOOD WOULD RUN OUT BEFORE YOU GOT MONEY TO BUY MORE.: NEVER TRUE

## 2022-11-28 ASSESSMENT — PATIENT HEALTH QUESTIONNAIRE - PHQ9
SUM OF ALL RESPONSES TO PHQ QUESTIONS 1-9: 0
2. FEELING DOWN, DEPRESSED OR HOPELESS: 0
1. LITTLE INTEREST OR PLEASURE IN DOING THINGS: 0
SUM OF ALL RESPONSES TO PHQ QUESTIONS 1-9: 0
SUM OF ALL RESPONSES TO PHQ9 QUESTIONS 1 & 2: 0

## 2022-11-28 ASSESSMENT — SOCIAL DETERMINANTS OF HEALTH (SDOH): HOW HARD IS IT FOR YOU TO PAY FOR THE VERY BASICS LIKE FOOD, HOUSING, MEDICAL CARE, AND HEATING?: NOT HARD AT ALL

## 2022-11-28 NOTE — PROGRESS NOTES
2022    Blood pressure 102/78, pulse 77, temperature 98.9 °F (37.2 °C), temperature source Oral, resp. rate 18, height 5' 10\" (1.778 m), weight 213 lb 3.2 oz (96.7 kg), SpO2 97 %. Tomasz Ascencio (:  1995) is a 33 y.o. male, here for evaluation of the following medical concerns:    Chief Complaint   Patient presents with    Cough     Cough, productive cough, coughing up mucus, runny nose, sore throat x 1 week     New patient here to establish. No ongoing PMH  No daily med use    Here for concerns of illness. Onset 8-9 days ago, with scratchy throat. Then cough. With mucus production, bryce in AM.  + RN  + Congestion    No f/c  No body aches  No n/v/d    He is concerned that this is getting worse and is developing a HA. 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 No ill contacts but contacts have been exposed to strep and flu. He did go to urgent care over the weekend and tested neg for flu and strep. Is UTD with all recommended vaccines. There is no problem list on file for this patient. Body mass index is 30.59 kg/m². Wt Readings from Last 3 Encounters:   22 213 lb 3.2 oz (96.7 kg)   21 214 lb 1.1 oz (97.1 kg)   21 203 lb 14.8 oz (92.5 kg)       BP Readings from Last 3 Encounters:   22 102/78   21 124/74   21 115/77       Allergies   Allergen Reactions    Augmentin [Amoxicillin-Pot Clavulanate]     Azithromycin Swelling     Swelling around the mouth. Cefzil [Cefprozil]     Erythromycin     Vancomycin        Prior to Visit Medications    Medication Sig Taking? Authorizing Provider   ondansetron (ZOFRAN ODT) 4 MG disintegrating tablet Take 1 tablet by mouth every 8 hours as needed for Nausea or Vomiting Let dissolve in mouth.  Yes ASHLEY Hamm   dicyclomine (BENTYL) 10 MG capsule Take 1 capsule by mouth 4 times daily for 12 doses  ASHLEY Hamm        Social History     Tobacco Use    Smoking status: Never Smokeless tobacco: Never   Vaping Use    Vaping Use: Never used   Substance Use Topics    Alcohol use: No    Drug use: No       Review of Systems As above     Physical Exam  Vitals and nursing note reviewed. Constitutional:       General: He is not in acute distress. Appearance: Normal appearance. He is well-developed. He is not diaphoretic. HENT:      Head: Normocephalic and atraumatic. Right Ear: Tympanic membrane and ear canal normal.      Left Ear: Tympanic membrane and ear canal normal.      Nose: Nose normal. No congestion. Mouth/Throat:      Mouth: Mucous membranes are moist.      Pharynx: Posterior oropharyngeal erythema (over palate and tonsils) present. Cardiovascular:      Rate and Rhythm: Normal rate and regular rhythm. Pulses: Normal pulses. Heart sounds: Normal heart sounds. No murmur heard. Pulmonary:      Effort: Pulmonary effort is normal. No respiratory distress. Breath sounds: Normal breath sounds. No wheezing or rales. Musculoskeletal:      Cervical back: Normal range of motion. Right lower leg: No edema. Left lower leg: No edema. Lymphadenopathy:      Cervical: Cervical adenopathy present. Skin:     General: Skin is warm and dry. Neurological:      General: No focal deficit present. Mental Status: He is alert and oriented to person, place, and time. Mental status is at baseline. Psychiatric:         Mood and Affect: Mood normal.         Behavior: Behavior normal.         Thought Content: Thought content normal.         Judgment: Judgment normal.       ASSESSMENT/PLAN:    1. Sore throat  - rapid strep neg. Await throat cx (due to multiple antibiotic allergies, will await culture before prescribing an antbiotic). - continue symptomatic care and report any new or worsening of symptoms.    - POCT rapid strep A  - Culture, Throat    Return if symptoms worsen or fail to improve.     An  MXP4ignature was used to authenticate this note.    --Zay Alarcon MD on 11/28/2022 at 5:27 PM

## 2022-11-30 LAB — THROAT CULTURE: NORMAL

## 2022-12-01 RX ORDER — DOXYCYCLINE HYCLATE 100 MG
100 TABLET ORAL 2 TIMES DAILY
Qty: 20 TABLET | Refills: 0 | Status: SHIPPED | OUTPATIENT
Start: 2022-12-01 | End: 2022-12-11

## 2022-12-13 ENCOUNTER — OFFICE VISIT (OUTPATIENT)
Dept: FAMILY MEDICINE CLINIC | Age: 27
End: 2022-12-13

## 2022-12-13 VITALS
OXYGEN SATURATION: 97 % | RESPIRATION RATE: 16 BRPM | DIASTOLIC BLOOD PRESSURE: 68 MMHG | WEIGHT: 213.6 LBS | HEIGHT: 70 IN | SYSTOLIC BLOOD PRESSURE: 110 MMHG | BODY MASS INDEX: 30.58 KG/M2 | HEART RATE: 87 BPM

## 2022-12-13 DIAGNOSIS — Z13.1 SCREENING FOR DIABETES MELLITUS: ICD-10-CM

## 2022-12-13 DIAGNOSIS — F33.0 MILD EPISODE OF RECURRENT MAJOR DEPRESSIVE DISORDER (HCC): ICD-10-CM

## 2022-12-13 DIAGNOSIS — Z13.220 SCREENING, LIPID: ICD-10-CM

## 2022-12-13 DIAGNOSIS — Z00.00 WELL ADULT EXAM: Primary | ICD-10-CM

## 2022-12-13 DIAGNOSIS — H93.12 TINNITUS OF LEFT EAR: ICD-10-CM

## 2022-12-13 ASSESSMENT — ENCOUNTER SYMPTOMS
RESPIRATORY NEGATIVE: 1
ALLERGIC/IMMUNOLOGIC NEGATIVE: 1
GASTROINTESTINAL NEGATIVE: 1
EYES NEGATIVE: 1
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
## 2023-02-02 ENCOUNTER — OFFICE VISIT (OUTPATIENT)
Dept: FAMILY MEDICINE CLINIC | Age: 28
End: 2023-02-02
Payer: COMMERCIAL

## 2023-02-02 VITALS
HEART RATE: 84 BPM | BODY MASS INDEX: 30.61 KG/M2 | RESPIRATION RATE: 16 BRPM | TEMPERATURE: 98.2 F | HEIGHT: 70 IN | SYSTOLIC BLOOD PRESSURE: 104 MMHG | DIASTOLIC BLOOD PRESSURE: 62 MMHG | WEIGHT: 213.8 LBS | OXYGEN SATURATION: 97 %

## 2023-02-02 DIAGNOSIS — J02.9 ACUTE PHARYNGITIS, UNSPECIFIED ETIOLOGY: Primary | ICD-10-CM

## 2023-02-02 LAB — S PYO AG THROAT QL: NORMAL

## 2023-02-02 PROCEDURE — 99213 OFFICE O/P EST LOW 20 MIN: CPT | Performed by: FAMILY MEDICINE

## 2023-02-02 PROCEDURE — 87880 STREP A ASSAY W/OPTIC: CPT | Performed by: FAMILY MEDICINE

## 2023-02-02 SDOH — ECONOMIC STABILITY: FOOD INSECURITY: WITHIN THE PAST 12 MONTHS, THE FOOD YOU BOUGHT JUST DIDN'T LAST AND YOU DIDN'T HAVE MONEY TO GET MORE.: NEVER TRUE

## 2023-02-02 SDOH — ECONOMIC STABILITY: FOOD INSECURITY: WITHIN THE PAST 12 MONTHS, YOU WORRIED THAT YOUR FOOD WOULD RUN OUT BEFORE YOU GOT MONEY TO BUY MORE.: NEVER TRUE

## 2023-02-02 SDOH — ECONOMIC STABILITY: TRANSPORTATION INSECURITY
IN THE PAST 12 MONTHS, HAS LACK OF TRANSPORTATION KEPT YOU FROM MEETINGS, WORK, OR FROM GETTING THINGS NEEDED FOR DAILY LIVING?: NO

## 2023-02-02 SDOH — ECONOMIC STABILITY: INCOME INSECURITY: HOW HARD IS IT FOR YOU TO PAY FOR THE VERY BASICS LIKE FOOD, HOUSING, MEDICAL CARE, AND HEATING?: NOT VERY HARD

## 2023-02-02 SDOH — ECONOMIC STABILITY: HOUSING INSECURITY
IN THE LAST 12 MONTHS, WAS THERE A TIME WHEN YOU DID NOT HAVE A STEADY PLACE TO SLEEP OR SLEPT IN A SHELTER (INCLUDING NOW)?: NO

## 2023-02-02 ASSESSMENT — PATIENT HEALTH QUESTIONNAIRE - PHQ9
5. POOR APPETITE OR OVEREATING: 0
SUM OF ALL RESPONSES TO PHQ QUESTIONS 1-9: 0
2. FEELING DOWN, DEPRESSED OR HOPELESS: 0
8. MOVING OR SPEAKING SO SLOWLY THAT OTHER PEOPLE COULD HAVE NOTICED. OR THE OPPOSITE, BEING SO FIGETY OR RESTLESS THAT YOU HAVE BEEN MOVING AROUND A LOT MORE THAN USUAL: 0
7. TROUBLE CONCENTRATING ON THINGS, SUCH AS READING THE NEWSPAPER OR WATCHING TELEVISION: 0
1. LITTLE INTEREST OR PLEASURE IN DOING THINGS: 0
SUM OF ALL RESPONSES TO PHQ QUESTIONS 1-9: 0
SUM OF ALL RESPONSES TO PHQ QUESTIONS 1-9: 0
9. THOUGHTS THAT YOU WOULD BE BETTER OFF DEAD, OR OF HURTING YOURSELF: 0
6. FEELING BAD ABOUT YOURSELF - OR THAT YOU ARE A FAILURE OR HAVE LET YOURSELF OR YOUR FAMILY DOWN: 0
4. FEELING TIRED OR HAVING LITTLE ENERGY: 0
3. TROUBLE FALLING OR STAYING ASLEEP: 0
SUM OF ALL RESPONSES TO PHQ9 QUESTIONS 1 & 2: 0
SUM OF ALL RESPONSES TO PHQ QUESTIONS 1-9: 0

## 2023-02-02 NOTE — PATIENT INSTRUCTIONS
1. Aleve 2 tabs every 12 hrs  2. Sudafed (pseudoephedrine) 12 hour extended release 120mg in the morning  3. Afrin nasal spray at bedtime (12 hours after the dose of Sudafed 12 hour)  4. Extra cough medicine that has an antihistamine (like Nyquil) at bedtime if still having a cough- but it tends to cause sedation, so not to be used prior to work or driving. Reasons to see a doctor: fever, shortness of breath, significant pain somewhere, any infection lasting longer than a week without improvement.

## 2023-02-02 NOTE — PROGRESS NOTES
2023    Blood pressure 104/62, pulse 84, temperature 98.2 °F (36.8 °C), temperature source Oral, resp. rate 16, height 5' 10\" (1.778 m), weight 213 lb 12.8 oz (97 kg), SpO2 97 %. Jessica Pineda (:  1995) is a 33 y.o. male, here for evaluation of the following medical concerns:    Chief Complaint   Patient presents with    Cough     Cough, chest congestion, throat feels scratchy x 2 days     Here for illness. X 2 days. Feels that he coughs up mucus in the mornings for the past 2 days. Mild scratchy throat. No PND   No fever  No aches  No HA  No n/v/d    No ill contacts    He us fully vaccinated for flu/COVID    Rx used: none. Patient Active Problem List   Diagnosis    Mild episode of recurrent major depressive disorder (HCC)    Tinnitus of left ear        Body mass index is 30.68 kg/m². Wt Readings from Last 3 Encounters:   23 213 lb 12.8 oz (97 kg)   22 213 lb 9.6 oz (96.9 kg)   22 213 lb 3.2 oz (96.7 kg)       BP Readings from Last 3 Encounters:   23 104/62   22 110/68   22 102/78       Allergies   Allergen Reactions    Augmentin [Amoxicillin-Pot Clavulanate]     Azithromycin Swelling     Swelling around the mouth. Cefzil [Cefprozil]     Erythromycin     Vancomycin        Prior to Visit Medications    Not on File        Social History     Tobacco Use    Smoking status: Never    Smokeless tobacco: Never   Vaping Use    Vaping Use: Never used   Substance Use Topics    Alcohol use: No    Drug use: No       Review of Systems As above     Physical Exam  Vitals and nursing note reviewed. Constitutional:       General: He is not in acute distress. Appearance: Normal appearance. He is well-developed. He is not diaphoretic. HENT:      Head: Normocephalic and atraumatic.       Right Ear: Tympanic membrane, ear canal and external ear normal.      Left Ear: Tympanic membrane, ear canal and external ear normal.      Nose: Nose normal. No congestion or rhinorrhea. Mouth/Throat:      Mouth: Mucous membranes are moist.      Pharynx: Posterior oropharyngeal erythema present. No oropharyngeal exudate. Comments: Mild posterior and tonsillar erythema without purulent d/c   Eyes:      General: No scleral icterus. Right eye: No discharge. Left eye: No discharge. Conjunctiva/sclera: Conjunctivae normal.      Pupils: Pupils are equal, round, and reactive to light. Neck:      Thyroid: No thyromegaly. Cardiovascular:      Rate and Rhythm: Normal rate and regular rhythm. Pulses: Normal pulses. Heart sounds: Normal heart sounds. No murmur heard. Pulmonary:      Effort: Pulmonary effort is normal. No respiratory distress. Breath sounds: Normal breath sounds. No wheezing, rhonchi or rales. Musculoskeletal:      Cervical back: Normal range of motion and neck supple. No rigidity. No muscular tenderness. Lymphadenopathy:      Cervical: Cervical adenopathy (mild anterior cervical LAD) present. Upper Body:      Right upper body: No supraclavicular adenopathy. Left upper body: No supraclavicular adenopathy. Skin:     General: Skin is warm and dry. Neurological:      General: No focal deficit present. Mental Status: He is alert and oriented to person, place, and time. Mental status is at baseline. Psychiatric:         Mood and Affect: Mood normal.         Behavior: Behavior normal.         Thought Content: Thought content normal.         Judgment: Judgment normal.       ASSESSMENT/PLAN:    1. Acute pharyngitis, unspecified etiology  - POCT rapid strep A  - Culture, Throat    Rapid strep is negative. discussed that this illness is likely viral.    Continue supportive care, rest, fluids. Reasons to see a doctor: fever, shortness of breath, significant pain somewhere, any infection lasting longer than a week without improvement. Return if symptoms worsen or fail to improve.     An  electronicsignature was used to authenticate this note.     --Crystal Alvarenga MD on 2/2/2023 at 4:53 PM

## 2023-02-04 LAB — THROAT CULTURE: NORMAL

## 2023-08-09 ENCOUNTER — OFFICE VISIT (OUTPATIENT)
Dept: FAMILY MEDICINE CLINIC | Age: 28
End: 2023-08-09
Payer: COMMERCIAL

## 2023-08-09 VITALS
WEIGHT: 225 LBS | HEIGHT: 70 IN | RESPIRATION RATE: 16 BRPM | BODY MASS INDEX: 32.21 KG/M2 | HEART RATE: 74 BPM | OXYGEN SATURATION: 97 %

## 2023-08-09 DIAGNOSIS — J02.9 SORE THROAT: Primary | ICD-10-CM

## 2023-08-09 DIAGNOSIS — J02.9 ACUTE VIRAL PHARYNGITIS: ICD-10-CM

## 2023-08-09 LAB — S PYO AG THROAT QL: NORMAL

## 2023-08-09 PROCEDURE — 99213 OFFICE O/P EST LOW 20 MIN: CPT | Performed by: FAMILY MEDICINE

## 2023-08-09 PROCEDURE — 87880 STREP A ASSAY W/OPTIC: CPT | Performed by: FAMILY MEDICINE

## 2023-08-12 LAB — BACTERIA THROAT AEROBE CULT: NORMAL

## 2023-09-05 ENCOUNTER — PATIENT MESSAGE (OUTPATIENT)
Dept: FAMILY MEDICINE CLINIC | Age: 28
End: 2023-09-05

## 2023-09-05 RX ORDER — OMEPRAZOLE 40 MG/1
40 CAPSULE, DELAYED RELEASE ORAL
Qty: 90 CAPSULE | Refills: 1 | Status: SHIPPED | OUTPATIENT
Start: 2023-09-05

## 2023-09-05 NOTE — TELEPHONE ENCOUNTER
From: Barbara Chiu  To: Dr. Dana Marks: 9/5/2023 11:51 AM EDT  Subject: Omeprazole    Will I need an appointment to get a prescription for Omeprazole? I've been taking 40mg OTC for a while, but I would probably save money getting it as a prescription.

## 2023-12-12 ENCOUNTER — OFFICE VISIT (OUTPATIENT)
Dept: FAMILY MEDICINE CLINIC | Age: 28
End: 2023-12-12
Payer: COMMERCIAL

## 2023-12-12 VITALS
HEIGHT: 70 IN | BODY MASS INDEX: 31.84 KG/M2 | RESPIRATION RATE: 18 BRPM | SYSTOLIC BLOOD PRESSURE: 124 MMHG | DIASTOLIC BLOOD PRESSURE: 78 MMHG | HEART RATE: 79 BPM | TEMPERATURE: 98.4 F | WEIGHT: 222.4 LBS | OXYGEN SATURATION: 97 %

## 2023-12-12 DIAGNOSIS — L30.9 ECZEMA, UNSPECIFIED TYPE: ICD-10-CM

## 2023-12-12 DIAGNOSIS — Z13.1 SCREENING FOR DIABETES MELLITUS: ICD-10-CM

## 2023-12-12 DIAGNOSIS — Z13.220 SCREENING, LIPID: ICD-10-CM

## 2023-12-12 DIAGNOSIS — M25.562 ACUTE PAIN OF BOTH KNEES: ICD-10-CM

## 2023-12-12 DIAGNOSIS — Z00.00 WELL ADULT EXAM: Primary | ICD-10-CM

## 2023-12-12 DIAGNOSIS — F33.0 MILD EPISODE OF RECURRENT MAJOR DEPRESSIVE DISORDER (HCC): ICD-10-CM

## 2023-12-12 DIAGNOSIS — M25.561 ACUTE PAIN OF BOTH KNEES: ICD-10-CM

## 2023-12-12 DIAGNOSIS — K21.9 GASTROESOPHAGEAL REFLUX DISEASE WITHOUT ESOPHAGITIS: ICD-10-CM

## 2023-12-12 PROCEDURE — 99395 PREV VISIT EST AGE 18-39: CPT | Performed by: FAMILY MEDICINE

## 2023-12-12 RX ORDER — MELOXICAM 15 MG/1
15 TABLET ORAL DAILY
Qty: 30 TABLET | Refills: 0 | Status: SHIPPED | OUTPATIENT
Start: 2023-12-12

## 2023-12-12 RX ORDER — TRIAMCINOLONE ACETONIDE 5 MG/G
OINTMENT TOPICAL
Qty: 30 G | Refills: 1 | Status: SHIPPED | OUTPATIENT
Start: 2023-12-12 | End: 2023-12-19

## 2023-12-12 ASSESSMENT — ENCOUNTER SYMPTOMS
ALLERGIC/IMMUNOLOGIC NEGATIVE: 1
RESPIRATORY NEGATIVE: 1
GASTROINTESTINAL NEGATIVE: 1
EYES NEGATIVE: 1

## 2024-01-18 RX ORDER — MELOXICAM 15 MG/1
15 TABLET ORAL DAILY
Qty: 30 TABLET | Refills: 0 | Status: SHIPPED | OUTPATIENT
Start: 2024-01-18

## 2024-04-09 ENCOUNTER — OFFICE VISIT (OUTPATIENT)
Age: 29
End: 2024-04-09

## 2024-04-09 VITALS
SYSTOLIC BLOOD PRESSURE: 112 MMHG | OXYGEN SATURATION: 97 % | BODY MASS INDEX: 32 KG/M2 | DIASTOLIC BLOOD PRESSURE: 74 MMHG | TEMPERATURE: 98.5 F | WEIGHT: 223 LBS | HEART RATE: 76 BPM

## 2024-04-09 DIAGNOSIS — R11.0 NAUSEA: Primary | ICD-10-CM

## 2024-04-09 DIAGNOSIS — G47.00 INSOMNIA, UNSPECIFIED TYPE: ICD-10-CM

## 2024-04-09 RX ORDER — ONDANSETRON 4 MG/1
4 TABLET, ORALLY DISINTEGRATING ORAL 3 TIMES DAILY PRN
Qty: 21 TABLET | Refills: 0 | Status: SHIPPED | OUTPATIENT
Start: 2024-04-09 | End: 2024-04-16

## 2024-04-09 ASSESSMENT — ENCOUNTER SYMPTOMS
SHORTNESS OF BREATH: 0
COUGH: 0
ABDOMINAL PAIN: 0
CHEST TIGHTNESS: 0
DIARRHEA: 0
WHEEZING: 0
CONSTIPATION: 0
NAUSEA: 1
VOMITING: 0

## 2024-04-09 NOTE — PATIENT INSTRUCTIONS
Please take melatonin for sleep  Please use zofran for nausea.   Please follow up with PCP for further evaluation.

## 2024-04-09 NOTE — PROGRESS NOTES
Truong Norris (:  1995) is a 28 y.o. male,New patient, here for evaluation of the following chief complaint(s):  Insomnia and Nausea (Symptoms started yesterday.)      ASSESSMENT/PLAN:    ICD-10-CM    1. Nausea  R11.0 ondansetron (ZOFRAN-ODT) 4 MG disintegrating tablet      2. Insomnia, unspecified type  G47.00           Patient presents for nausea and insomnia that he thinks this may be secondary from nightmare. Zofran sent for this.  Patient advised to take melatonin for symptoms.   Follow up with PCP for further evaluation    SUBJECTIVE/OBJECTIVE:    History provided by:  Patient   used: No      HPI:   28 y.o. male presents with symptoms of nausea and insomnia started since last night. He states that he has had increased stress due to recent life events. He states that he has had taken benadryl and melatonin previously for sleep that has helped. He states that he is still nauseous.     Vitals:    24 1639   BP: 112/74   Site: Left Upper Arm   Position: Sitting   Cuff Size: Medium Adult   Pulse: 76   Temp: 98.5 °F (36.9 °C)   TempSrc: Oral   SpO2: 97%   Weight: 101.2 kg (223 lb)       Review of Systems   Constitutional:  Negative for chills, diaphoresis, fatigue and fever.   Respiratory:  Negative for cough, chest tightness, shortness of breath and wheezing.    Cardiovascular:  Negative for chest pain.   Gastrointestinal:  Positive for nausea. Negative for abdominal pain, constipation, diarrhea and vomiting.   Musculoskeletal:  Negative for neck pain and neck stiffness.   Skin:  Negative for rash.   Psychiatric/Behavioral:  Positive for sleep disturbance.        Physical Exam  Vitals and nursing note reviewed.   Constitutional:       Appearance: Normal appearance.   HENT:      Head: Normocephalic and atraumatic.   Eyes:      Extraocular Movements: Extraocular movements intact.      Conjunctiva/sclera: Conjunctivae normal.   Cardiovascular:      Rate and Rhythm: Normal rate

## 2024-04-10 RX ORDER — MELOXICAM 15 MG/1
15 TABLET ORAL DAILY
Qty: 30 TABLET | Refills: 0 | Status: SHIPPED | OUTPATIENT
Start: 2024-04-10

## 2024-04-10 RX ORDER — MELOXICAM 15 MG/1
15 TABLET ORAL DAILY
Qty: 30 TABLET | Refills: 0 | OUTPATIENT
Start: 2024-04-10

## 2024-04-10 NOTE — TELEPHONE ENCOUNTER
Truong was seen in urgent care recently for nausea. Meloxicam could cause stomach irritation, with nausea.  Please offer him an appointment to review his symptoms what he needs to feel better.  Thanks.

## 2024-04-15 RX ORDER — OMEPRAZOLE 40 MG/1
40 CAPSULE, DELAYED RELEASE ORAL
Qty: 90 CAPSULE | Refills: 1 | Status: SHIPPED | OUTPATIENT
Start: 2024-04-15

## 2024-11-07 RX ORDER — TRIAMCINOLONE ACETONIDE 5 MG/G
OINTMENT TOPICAL
Qty: 30 G | Refills: 1 | Status: SHIPPED | OUTPATIENT
Start: 2024-11-07

## 2024-12-07 ENCOUNTER — OFFICE VISIT (OUTPATIENT)
Age: 29
End: 2024-12-07

## 2024-12-07 VITALS
TEMPERATURE: 98.4 F | WEIGHT: 216 LBS | HEART RATE: 77 BPM | SYSTOLIC BLOOD PRESSURE: 113 MMHG | OXYGEN SATURATION: 95 % | DIASTOLIC BLOOD PRESSURE: 77 MMHG

## 2024-12-07 DIAGNOSIS — K12.2 UVULITIS: ICD-10-CM

## 2024-12-07 DIAGNOSIS — J02.9 SORE THROAT: Primary | ICD-10-CM

## 2024-12-07 LAB — S PYO AG THROAT QL: NORMAL

## 2024-12-07 RX ORDER — OMEPRAZOLE 40 MG/1
40 CAPSULE, DELAYED RELEASE ORAL DAILY
COMMUNITY
Start: 2024-09-26

## 2024-12-07 RX ORDER — SULFAMETHOXAZOLE AND TRIMETHOPRIM 800; 160 MG/1; MG/1
1 TABLET ORAL 2 TIMES DAILY
Qty: 14 TABLET | Refills: 0 | Status: SHIPPED | OUTPATIENT
Start: 2024-12-07 | End: 2024-12-14

## 2024-12-07 RX ORDER — AZITHROMYCIN 250 MG/1
TABLET, FILM COATED ORAL
Qty: 6 TABLET | Refills: 0 | Status: SHIPPED | OUTPATIENT
Start: 2024-12-07 | End: 2024-12-07 | Stop reason: CLARIF

## 2024-12-07 RX ORDER — METHYLPREDNISOLONE 4 MG/1
TABLET ORAL
Qty: 1 KIT | Refills: 0 | Status: SHIPPED | OUTPATIENT
Start: 2024-12-07 | End: 2024-12-13

## 2024-12-07 RX ORDER — TRIAMCINOLONE ACETONIDE 5 MG/G
OINTMENT TOPICAL
COMMUNITY
Start: 2024-11-07

## 2024-12-07 ASSESSMENT — ENCOUNTER SYMPTOMS
SORE THROAT: 1
VOMITING: 0
SHORTNESS OF BREATH: 0
CONSTIPATION: 0
NAUSEA: 0
CHEST TIGHTNESS: 0
SINUS PRESSURE: 1
DIARRHEA: 0
WHEEZING: 0
ABDOMINAL PAIN: 0
COUGH: 1

## 2024-12-07 NOTE — PATIENT INSTRUCTIONS
Please take medications as prescribed  Please follow up with pcp  Please go to ED if you develop shortness of breath or signs of airway compromise.

## 2024-12-07 NOTE — PROGRESS NOTES
Ricardo Dolan (:  1995) is a 29 y.o. male,New patient, here for evaluation of the following chief complaint(s):  Congestion (Runny nose , cough , sore throat , HA x 4 days )      ASSESSMENT/PLAN:    ICD-10-CM    1. Sore throat  J02.9 POCT rapid strep A     sulfamethoxazole-trimethoprim (BACTRIM DS) 800-160 MG per tablet     DISCONTINUED: azithromycin (ZITHROMAX) 250 MG tablet      2. Uvulitis  K12.2 methylPREDNISolone (MEDROL DOSEPACK) 4 MG tablet     sulfamethoxazole-trimethoprim (BACTRIM DS) 800-160 MG per tablet     DISCONTINUED: azithromycin (ZITHROMAX) 250 MG tablet          Patient presents for cough, sore throat.   On exam no wheezes, rhonchi, rales noted. She does have Uvula swelling noted on exam. Uvula midline.   POCT STREP: negative  Given this will rx medrol dosepack  Initially was going to prescribe azithromycin for symptoms, however, patient states that he does not tolerate well. Based on this and allergies will rx bactrim for symptoms.   Please take medications as prescribed  Please follow up with pcp  Please go to ED if you develop shortness of breath or signs of airway compromise.     SUBJECTIVE/OBJECTIVE:    History provided by:  Patient   used: No      HPI:   29 y.o. male presents with symptoms of cough, congestion, sore throat ongoing since about 4 days. He states that his girlfriend has been sick with similar symptoms. No fevers. No chest pain. No shortness of breath. Not a current smoker. He does not have asthma, no inhaler use. He has taken robitussin for symptoms. Cough is productive with phlegm.       Vitals:    24 1555   BP: 113/77   Site: Left Upper Arm   Position: Sitting   Cuff Size: Medium Adult   Pulse: 77   Temp: 98.4 °F (36.9 °C)   TempSrc: Oral   SpO2: 95%   Weight: 98 kg (216 lb)       Review of Systems   Constitutional:  Negative for fatigue and fever.   HENT:  Positive for congestion, sinus pressure and sore throat.    Respiratory:   Pt requesting something for cough. Admit team notified. Awaiting new orders.

## 2024-12-15 ENCOUNTER — APPOINTMENT (OUTPATIENT)
Dept: GENERAL RADIOLOGY | Age: 29
End: 2024-12-15

## 2024-12-15 ENCOUNTER — APPOINTMENT (OUTPATIENT)
Dept: CT IMAGING | Age: 29
End: 2024-12-15

## 2024-12-15 ENCOUNTER — HOSPITAL ENCOUNTER (EMERGENCY)
Age: 29
Discharge: HOME | End: 2024-12-16
Attending: EMERGENCY MEDICINE

## 2024-12-15 DIAGNOSIS — E86.0 DEHYDRATION: ICD-10-CM

## 2024-12-15 DIAGNOSIS — R55 NEAR SYNCOPE: ICD-10-CM

## 2024-12-15 DIAGNOSIS — R19.7 NAUSEA VOMITING AND DIARRHEA: Primary | ICD-10-CM

## 2024-12-15 DIAGNOSIS — R11.2 NAUSEA VOMITING AND DIARRHEA: Primary | ICD-10-CM

## 2024-12-15 LAB
ALBUMIN SERPL-MCNC: 5.2 G/DL (ref 3.4–5)
ALBUMIN/GLOB SERPL: 1.5 {RATIO} (ref 1.1–2.2)
ALP SERPL-CCNC: 74 U/L (ref 40–129)
ALT SERPL-CCNC: 21 U/L (ref 10–40)
ANION GAP SERPL CALCULATED.3IONS-SCNC: 17 MMOL/L (ref 3–16)
AST SERPL-CCNC: 29 U/L (ref 15–37)
BASOPHILS # BLD: 0.1 K/UL (ref 0–0.2)
BASOPHILS NFR BLD: 0.6 %
BILIRUB SERPL-MCNC: 0.7 MG/DL (ref 0–1)
BUN SERPL-MCNC: 15 MG/DL (ref 7–20)
CALCIUM SERPL-MCNC: 10 MG/DL (ref 8.3–10.6)
CHLORIDE SERPL-SCNC: 103 MMOL/L (ref 99–110)
CO2 SERPL-SCNC: 19 MMOL/L (ref 21–32)
CREAT SERPL-MCNC: 1.3 MG/DL (ref 0.9–1.3)
DEPRECATED RDW RBC AUTO: 13.7 % (ref 12.4–15.4)
EOSINOPHIL # BLD: 0.1 K/UL (ref 0–0.6)
EOSINOPHIL NFR BLD: 0.9 %
FLUAV + FLUBV AG NOSE IA.RAPID: NOT DETECTED
FLUAV + FLUBV AG NOSE IA.RAPID: NOT DETECTED
GFR SERPLBLD CREATININE-BSD FMLA CKD-EPI: 77 ML/MIN/{1.73_M2}
GLUCOSE SERPL-MCNC: 117 MG/DL (ref 70–99)
HCT VFR BLD AUTO: 52.2 % (ref 40.5–52.5)
HGB BLD-MCNC: 17.7 G/DL (ref 13.5–17.5)
LACTATE BLDV-SCNC: 2.5 MMOL/L (ref 0.4–1.9)
LIPASE SERPL-CCNC: 43 U/L (ref 13–60)
LYMPHOCYTES # BLD: 3.4 K/UL (ref 1–5.1)
LYMPHOCYTES NFR BLD: 21.1 %
MAGNESIUM SERPL-MCNC: 1.98 MG/DL (ref 1.8–2.4)
MCH RBC QN AUTO: 29.6 PG (ref 26–34)
MCHC RBC AUTO-ENTMCNC: 34 G/DL (ref 31–36)
MCV RBC AUTO: 87.2 FL (ref 80–100)
MONOCYTES # BLD: 0.9 K/UL (ref 0–1.3)
MONOCYTES NFR BLD: 5.4 %
NEUTROPHILS # BLD: 11.4 K/UL (ref 1.7–7.7)
NEUTROPHILS NFR BLD: 72 %
PLATELET # BLD AUTO: 346 K/UL (ref 135–450)
PMV BLD AUTO: 7.7 FL (ref 5–10.5)
POTASSIUM SERPL-SCNC: 3.7 MMOL/L (ref 3.5–5.1)
PROCALCITONIN SERPL IA-MCNC: 0.03 NG/ML (ref 0–0.15)
PROT SERPL-MCNC: 8.6 G/DL (ref 6.4–8.2)
RBC # BLD AUTO: 5.99 M/UL (ref 4.2–5.9)
SARS-COV-2 RDRP RESP QL NAA+PROBE: NOT DETECTED
SODIUM SERPL-SCNC: 139 MMOL/L (ref 136–145)
TROPONIN, HIGH SENSITIVITY: <6 NG/L (ref 0–22)
WBC # BLD AUTO: 15.9 K/UL (ref 4–11)

## 2024-12-15 PROCEDURE — 83690 ASSAY OF LIPASE: CPT

## 2024-12-15 PROCEDURE — 85025 COMPLETE CBC W/AUTO DIFF WBC: CPT

## 2024-12-15 PROCEDURE — 99285 EMERGENCY DEPT VISIT HI MDM: CPT

## 2024-12-15 PROCEDURE — 6360000004 HC RX CONTRAST MEDICATION: Performed by: PHYSICIAN ASSISTANT

## 2024-12-15 PROCEDURE — 87635 SARS-COV-2 COVID-19 AMP PRB: CPT

## 2024-12-15 PROCEDURE — 96361 HYDRATE IV INFUSION ADD-ON: CPT

## 2024-12-15 PROCEDURE — 71045 X-RAY EXAM CHEST 1 VIEW: CPT

## 2024-12-15 PROCEDURE — 96374 THER/PROPH/DIAG INJ IV PUSH: CPT

## 2024-12-15 PROCEDURE — 2580000003 HC RX 258: Performed by: PHYSICIAN ASSISTANT

## 2024-12-15 PROCEDURE — 83735 ASSAY OF MAGNESIUM: CPT

## 2024-12-15 PROCEDURE — 87502 INFLUENZA DNA AMP PROBE: CPT

## 2024-12-15 PROCEDURE — 83605 ASSAY OF LACTIC ACID: CPT

## 2024-12-15 PROCEDURE — 84145 PROCALCITONIN (PCT): CPT

## 2024-12-15 PROCEDURE — 87040 BLOOD CULTURE FOR BACTERIA: CPT

## 2024-12-15 PROCEDURE — 84484 ASSAY OF TROPONIN QUANT: CPT

## 2024-12-15 PROCEDURE — 6360000002 HC RX W HCPCS: Performed by: PHYSICIAN ASSISTANT

## 2024-12-15 PROCEDURE — 80053 COMPREHEN METABOLIC PANEL: CPT

## 2024-12-15 PROCEDURE — 74177 CT ABD & PELVIS W/CONTRAST: CPT

## 2024-12-15 RX ORDER — 0.9 % SODIUM CHLORIDE 0.9 %
1000 INTRAVENOUS SOLUTION INTRAVENOUS ONCE
Status: COMPLETED | OUTPATIENT
Start: 2024-12-15 | End: 2024-12-16

## 2024-12-15 RX ORDER — IOPAMIDOL 755 MG/ML
75 INJECTION, SOLUTION INTRAVASCULAR
Status: COMPLETED | OUTPATIENT
Start: 2024-12-15 | End: 2024-12-15

## 2024-12-15 RX ORDER — DROPERIDOL 2.5 MG/ML
0.62 INJECTION, SOLUTION INTRAMUSCULAR; INTRAVENOUS ONCE
Status: COMPLETED | OUTPATIENT
Start: 2024-12-15 | End: 2024-12-15

## 2024-12-15 RX ADMIN — DROPERIDOL 0.62 MG: 2.5 INJECTION, SOLUTION INTRAMUSCULAR; INTRAVENOUS at 23:02

## 2024-12-15 RX ADMIN — SODIUM CHLORIDE 1000 ML: 9 INJECTION, SOLUTION INTRAVENOUS at 23:03

## 2024-12-15 RX ADMIN — IOPAMIDOL 75 ML: 755 INJECTION, SOLUTION INTRAVENOUS at 23:23

## 2024-12-15 ASSESSMENT — PAIN - FUNCTIONAL ASSESSMENT: PAIN_FUNCTIONAL_ASSESSMENT: 0-10

## 2024-12-15 ASSESSMENT — PAIN SCALES - GENERAL: PAINLEVEL_OUTOF10: 0

## 2024-12-16 ENCOUNTER — TELEPHONE (OUTPATIENT)
Dept: FAMILY MEDICINE CLINIC | Age: 29
End: 2024-12-16

## 2024-12-16 VITALS
SYSTOLIC BLOOD PRESSURE: 123 MMHG | HEART RATE: 98 BPM | RESPIRATION RATE: 16 BRPM | DIASTOLIC BLOOD PRESSURE: 80 MMHG | BODY MASS INDEX: 30.2 KG/M2 | OXYGEN SATURATION: 95 % | HEIGHT: 70 IN | TEMPERATURE: 98.3 F | WEIGHT: 210.98 LBS

## 2024-12-16 LAB
AMPHETAMINES UR QL SCN>1000 NG/ML: NORMAL
BACTERIA URNS QL MICRO: NORMAL /HPF
BARBITURATES UR QL SCN>200 NG/ML: NORMAL
BENZODIAZ UR QL SCN>200 NG/ML: NORMAL
BILIRUB UR QL STRIP.AUTO: NEGATIVE
CANNABINOIDS UR QL SCN>50 NG/ML: NORMAL
CLARITY UR: CLEAR
COCAINE UR QL SCN: NORMAL
COLOR UR: YELLOW
DRUG SCREEN COMMENT UR-IMP: NORMAL
EKG ATRIAL RATE: 98 BPM
EKG DIAGNOSIS: NORMAL
EKG P AXIS: -23 DEGREES
EKG P-R INTERVAL: 130 MS
EKG Q-T INTERVAL: 346 MS
EKG QRS DURATION: 86 MS
EKG QTC CALCULATION (BAZETT): 441 MS
EKG R AXIS: 9 DEGREES
EKG T AXIS: -2 DEGREES
EKG VENTRICULAR RATE: 98 BPM
EPI CELLS #/AREA URNS AUTO: 1 /HPF (ref 0–5)
FENTANYL SCREEN, URINE: NORMAL
GLUCOSE UR STRIP.AUTO-MCNC: NEGATIVE MG/DL
HGB UR QL STRIP.AUTO: NEGATIVE
HYALINE CASTS #/AREA URNS AUTO: 0 /LPF (ref 0–8)
KETONES UR STRIP.AUTO-MCNC: 15 MG/DL
LACTATE BLDV-SCNC: 1.4 MMOL/L (ref 0.4–1.9)
LEUKOCYTE ESTERASE UR QL STRIP.AUTO: NEGATIVE
METHADONE UR QL SCN>300 NG/ML: NORMAL
NITRITE UR QL STRIP.AUTO: NEGATIVE
OPIATES UR QL SCN>300 NG/ML: NORMAL
OXYCODONE UR QL SCN: NORMAL
PCP UR QL SCN>25 NG/ML: NORMAL
PH UR STRIP.AUTO: 5 [PH] (ref 5–8)
PH UR STRIP: 6 [PH]
PROT UR STRIP.AUTO-MCNC: ABNORMAL MG/DL
RBC CLUMPS #/AREA URNS AUTO: 3 /HPF (ref 0–4)
SP GR UR STRIP.AUTO: 1.08 (ref 1–1.03)
UA COMPLETE W REFLEX CULTURE PNL UR: ABNORMAL
UA DIPSTICK W REFLEX MICRO PNL UR: YES
URN SPEC COLLECT METH UR: ABNORMAL
UROBILINOGEN UR STRIP-ACNC: 0.2 E.U./DL
WBC #/AREA URNS AUTO: 1 /HPF (ref 0–5)

## 2024-12-16 PROCEDURE — 96361 HYDRATE IV INFUSION ADD-ON: CPT

## 2024-12-16 PROCEDURE — 93005 ELECTROCARDIOGRAM TRACING: CPT

## 2024-12-16 PROCEDURE — 81001 URINALYSIS AUTO W/SCOPE: CPT

## 2024-12-16 PROCEDURE — 80307 DRUG TEST PRSMV CHEM ANLYZR: CPT

## 2024-12-16 PROCEDURE — 83605 ASSAY OF LACTIC ACID: CPT

## 2024-12-16 RX ORDER — 0.9 % SODIUM CHLORIDE 0.9 %
1000 INTRAVENOUS SOLUTION INTRAVENOUS ONCE
Status: DISCONTINUED | OUTPATIENT
Start: 2024-12-16 | End: 2024-12-16 | Stop reason: HOSPADM

## 2024-12-16 RX ORDER — ONDANSETRON 8 MG/1
8 TABLET, ORALLY DISINTEGRATING ORAL EVERY 8 HOURS PRN
Qty: 20 TABLET | Refills: 0 | Status: SHIPPED | OUTPATIENT
Start: 2024-12-16

## 2024-12-16 RX ORDER — IBUPROFEN 600 MG/1
600 TABLET, FILM COATED ORAL EVERY 6 HOURS PRN
Qty: 30 TABLET | Refills: 0 | Status: SHIPPED | OUTPATIENT
Start: 2024-12-16

## 2024-12-16 ASSESSMENT — PAIN - FUNCTIONAL ASSESSMENT: PAIN_FUNCTIONAL_ASSESSMENT: 0-10

## 2024-12-16 ASSESSMENT — PAIN SCALES - GENERAL: PAINLEVEL_OUTOF10: 0

## 2024-12-16 NOTE — DISCHARGE INSTRUCTIONS
Your CT scan shows enteritis which is likely from a stomach virus that will go away on its own in a few days.  Drink plenty of fluids.  Take ondansetron as needed for nausea vomiting.  Take ibuprofen or acetaminophen as needed for pain.

## 2024-12-16 NOTE — ED PROVIDER NOTES
[facility] EMERGENCY DEPARTMENT  EMERGENCY DEPARTMENT ENCOUNTER        Pt Name: Truong Norris  MRN: [id number]  Birthdate 1995  Date of evaluation: 12/15/2024  Provider: Ayden Holliday PA-C  PCP: Jaime Trevino MD  Note Started: 10:16 PM EST 12/15/24       I have seen and evaluated this patient with my supervising physician TERESITA HORNE      I personally saw the patient and independently provided 32 minutes of non-concurrent critical care time out of the total critical care time provided.  This excludes time spent doing separately billable procedures.  This includes time at the bedside, data interpretation, medication management, obtaining critical history from collateral sources if the patient is unable to provide it directly, and physician consultation.  Specifics of interventions taken and potentially life-threatening diagnostic considerations are listed above in the medical decision making.      CHIEF COMPLAINT       Chief Complaint   Patient presents with    Vomiting     Pt presents in the ED for c/o of n/v and diarrhea that started today; pt fell to his knees in the lobby d/t vomiting but did not experience LOC; pt's states that he drove himself to the hospital and is disoriented; partner was admitted to [place] yesterday after 2 days of similar symptoms    Dizziness     Pt c/o of syncope and became briefly unresponsive to voice during triage but responded immediately when sternal rubbed       HISTORY OF PRESENT ILLNESS: 1 or more Elements     History From: patient  Limitations to history : None    Truong Norris is a 29 y.o. male who presents to the emergency department with a chief complaint of diarrhea, dizziness and near syncope.  I was called emergently into the patient's room as he was not responding.  Woke up quickly to sternal rub and is alert and oriented x 3.  Recently finished antibiotics 4 days ago for upper respiratory infection.  His

## 2024-12-16 NOTE — ED NOTES
Pt has repeatedly stated that he needs to have diarrhea; due to the need to sternal rub pt and pt repeatedly yelling out that he is going to pass out; RN told pt that he cannot get up until the medication that he been administered has the opportunity to work

## 2024-12-16 NOTE — TELEPHONE ENCOUNTER
Called and let the patient know no one from office has called and since he was in the ED it may be someone from hospital checking on him.  Advised that he answer the phone if he gets another call.

## 2024-12-16 NOTE — ED PROVIDER NOTES
ED Attending Attestation Note    This patient was seen by the advanced practice provider. Staffed w/ me at 0016    I personally saw the patient. Management plan and care decisions have been discussed with me and I made/approved the management plan and take responsibility for patient management.     Briefly, 29 y.o. male presents with diarrhea, nausea/vomiting, near syncope, abdominal pain  Partner with similar symptoms     Had episode of decreased HR and mentation, improved with stimulation in the ED.   Was on abx for URI; finished Weds     Past Medical History:   Diagnosis Date    Pectus excavatum      -f/u UA, repeat lactic, PO challenge, ambulate, dispo      Focused exam:   Gen: 29 y.o. male, uncomfortable appearing, somewhat pale  HEENT: NCAT. MM dry, PERRL. EOMI.   CV: Tachycardic w/o MRG  Vascular: intact and symmetric radial and DP pulses bilaterally  Lungs: CTAB. No incr WOB.   Abdomen: Soft, nontender, nondistended. No rebound/guarding.   Neuro: awake and alert, speech clear w/o aphasia; intact and symmetric strength and sensation in all 4 extremities, CN 2-12 intact bilaterally    MDM:   This is a 29-year-old male presenting with nausea, vomiting, diarrhea, abdominal cramping, had a near syncopal episode.  Partner with similar symptoms.  He has dry mucous membranes.  Abdominal exam is nonperitoneal.  Presentation is most consistent with viral gastroenteritis and subsequent diarrhea.  CT suggests enteritis.  Hemoconcentration on CT and leukocytosis, likely from vomiting.  Lactate initially elevated but resolved with fluids.  I do not feel this patient is septic but rather vital sign changes from dehydration.    Nursing notes, vital signs reviewed.       I independently interpreted the following studies     CT ABDOMEN PELVIS W IV CONTRAST Additional Contrast? None   Final Result   1. Diffuse fluid-filled stomach, small bowel and colon, with prominent   mucosal folds seen within loops of small bowel. Findings

## 2024-12-19 LAB — BACTERIA BLD CULT: NORMAL

## 2025-01-12 ENCOUNTER — OFFICE VISIT (OUTPATIENT)
Age: 30
End: 2025-01-12

## 2025-01-12 VITALS
SYSTOLIC BLOOD PRESSURE: 115 MMHG | TEMPERATURE: 98.4 F | BODY MASS INDEX: 30.21 KG/M2 | HEIGHT: 70 IN | OXYGEN SATURATION: 93 % | DIASTOLIC BLOOD PRESSURE: 81 MMHG | HEART RATE: 69 BPM | WEIGHT: 211 LBS

## 2025-01-12 DIAGNOSIS — J02.9 SORE THROAT: ICD-10-CM

## 2025-01-12 DIAGNOSIS — J01.10 ACUTE FRONTAL SINUSITIS, RECURRENCE NOT SPECIFIED: Primary | ICD-10-CM

## 2025-01-12 LAB — S PYO AG THROAT QL: NORMAL

## 2025-01-12 RX ORDER — PREDNISONE 20 MG/1
20 TABLET ORAL DAILY
Qty: 5 TABLET | Refills: 0 | Status: SHIPPED | OUTPATIENT
Start: 2025-01-12 | End: 2025-01-17

## 2025-01-12 RX ORDER — DOXYCYCLINE HYCLATE 100 MG
100 TABLET ORAL 2 TIMES DAILY
Qty: 14 TABLET | Refills: 0 | Status: SHIPPED | OUTPATIENT
Start: 2025-01-12 | End: 2025-01-19

## 2025-02-12 RX ORDER — OMEPRAZOLE 40 MG/1
40 CAPSULE, DELAYED RELEASE ORAL
Qty: 90 CAPSULE | Refills: 1 | Status: SHIPPED | OUTPATIENT
Start: 2025-02-12

## 2025-08-06 ENCOUNTER — HOSPITAL ENCOUNTER (EMERGENCY)
Age: 30
Discharge: HOME | End: 2025-08-06
Attending: EMERGENCY MEDICINE
Payer: COMMERCIAL

## 2025-08-06 VITALS
RESPIRATION RATE: 18 BRPM | HEART RATE: 59 BPM | TEMPERATURE: 98 F | DIASTOLIC BLOOD PRESSURE: 87 MMHG | HEIGHT: 70 IN | OXYGEN SATURATION: 98 % | WEIGHT: 215.61 LBS | BODY MASS INDEX: 30.87 KG/M2 | SYSTOLIC BLOOD PRESSURE: 125 MMHG

## 2025-08-06 DIAGNOSIS — T43.611A ACCIDENTAL CAFFEINE OVERDOSE, INITIAL ENCOUNTER (HCC): Primary | ICD-10-CM

## 2025-08-06 LAB
ANION GAP SERPL CALCULATED.3IONS-SCNC: 12 MMOL/L (ref 3–16)
BASOPHILS # BLD: 0 K/UL (ref 0–0.2)
BASOPHILS NFR BLD: 0.4 %
BUN SERPL-MCNC: 14 MG/DL (ref 7–20)
CALCIUM SERPL-MCNC: 9.6 MG/DL (ref 8.3–10.6)
CHLORIDE SERPL-SCNC: 102 MMOL/L (ref 99–110)
CO2 SERPL-SCNC: 27 MMOL/L (ref 21–32)
CREAT SERPL-MCNC: 1.2 MG/DL (ref 0.9–1.3)
DEPRECATED RDW RBC AUTO: 13.4 % (ref 12.4–15.4)
EKG ATRIAL RATE: 55 BPM
EKG DIAGNOSIS: NORMAL
EKG P AXIS: 72 DEGREES
EKG P-R INTERVAL: 140 MS
EKG Q-T INTERVAL: 450 MS
EKG QRS DURATION: 98 MS
EKG QTC CALCULATION (BAZETT): 430 MS
EKG R AXIS: 42 DEGREES
EKG T AXIS: 39 DEGREES
EKG VENTRICULAR RATE: 55 BPM
EOSINOPHIL # BLD: 0.2 K/UL (ref 0–0.6)
EOSINOPHIL NFR BLD: 2.4 %
GFR SERPLBLD CREATININE-BSD FMLA CKD-EPI: 84 ML/MIN/{1.73_M2}
GLUCOSE SERPL-MCNC: 91 MG/DL (ref 70–99)
HCT VFR BLD AUTO: 44.8 % (ref 40.5–52.5)
HGB BLD-MCNC: 15.5 G/DL (ref 13.5–17.5)
LYMPHOCYTES # BLD: 2.2 K/UL (ref 1–5.1)
LYMPHOCYTES NFR BLD: 31.2 %
MCH RBC QN AUTO: 29.6 PG (ref 26–34)
MCHC RBC AUTO-ENTMCNC: 34.6 G/DL (ref 31–36)
MCV RBC AUTO: 85.6 FL (ref 80–100)
MONOCYTES # BLD: 0.4 K/UL (ref 0–1.3)
MONOCYTES NFR BLD: 5.2 %
NEUTROPHILS # BLD: 4.3 K/UL (ref 1.7–7.7)
NEUTROPHILS NFR BLD: 60.8 %
PLATELET # BLD AUTO: 261 K/UL (ref 135–450)
PMV BLD AUTO: 7.8 FL (ref 5–10.5)
POTASSIUM SERPL-SCNC: 3.7 MMOL/L (ref 3.5–5.1)
RBC # BLD AUTO: 5.24 M/UL (ref 4.2–5.9)
SODIUM SERPL-SCNC: 141 MMOL/L (ref 136–145)
WBC # BLD AUTO: 7 K/UL (ref 4–11)

## 2025-08-06 PROCEDURE — 85025 COMPLETE CBC W/AUTO DIFF WBC: CPT

## 2025-08-06 PROCEDURE — 99284 EMERGENCY DEPT VISIT MOD MDM: CPT

## 2025-08-06 PROCEDURE — 80048 BASIC METABOLIC PNL TOTAL CA: CPT

## 2025-08-06 PROCEDURE — 2580000003 HC RX 258: Performed by: EMERGENCY MEDICINE

## 2025-08-06 PROCEDURE — 93005 ELECTROCARDIOGRAM TRACING: CPT | Performed by: EMERGENCY MEDICINE

## 2025-08-06 PROCEDURE — 36415 COLL VENOUS BLD VENIPUNCTURE: CPT

## 2025-08-06 RX ORDER — 0.9 % SODIUM CHLORIDE 0.9 %
1000 INTRAVENOUS SOLUTION INTRAVENOUS ONCE
Status: COMPLETED | OUTPATIENT
Start: 2025-08-06 | End: 2025-08-06

## 2025-08-06 RX ORDER — HYDROXYZINE HYDROCHLORIDE 25 MG/1
25 TABLET, FILM COATED ORAL EVERY 8 HOURS PRN
Qty: 30 TABLET | Refills: 0 | Status: SHIPPED | OUTPATIENT
Start: 2025-08-06 | End: 2025-08-16

## 2025-08-06 RX ADMIN — SODIUM CHLORIDE 1000 ML: 0.9 INJECTION, SOLUTION INTRAVENOUS at 11:35

## 2025-08-06 ASSESSMENT — LIFESTYLE VARIABLES
HOW OFTEN DO YOU HAVE A DRINK CONTAINING ALCOHOL: NEVER
HOW MANY STANDARD DRINKS CONTAINING ALCOHOL DO YOU HAVE ON A TYPICAL DAY: PATIENT DOES NOT DRINK

## 2025-08-06 ASSESSMENT — PAIN SCALES - GENERAL: PAINLEVEL_OUTOF10: 0

## 2025-08-06 ASSESSMENT — PAIN - FUNCTIONAL ASSESSMENT: PAIN_FUNCTIONAL_ASSESSMENT: 0-10

## 2025-08-11 ENCOUNTER — OFFICE VISIT (OUTPATIENT)
Dept: FAMILY MEDICINE CLINIC | Age: 30
End: 2025-08-11

## 2025-08-11 VITALS
SYSTOLIC BLOOD PRESSURE: 100 MMHG | BODY MASS INDEX: 30.06 KG/M2 | HEART RATE: 88 BPM | DIASTOLIC BLOOD PRESSURE: 64 MMHG | WEIGHT: 210 LBS | HEIGHT: 70 IN | OXYGEN SATURATION: 97 %

## 2025-08-11 DIAGNOSIS — Z11.3 SCREENING EXAMINATION FOR STI: ICD-10-CM

## 2025-08-11 DIAGNOSIS — F43.0 STRESS REACTION: Primary | ICD-10-CM

## 2025-08-11 SDOH — ECONOMIC STABILITY: FOOD INSECURITY: WITHIN THE PAST 12 MONTHS, THE FOOD YOU BOUGHT JUST DIDN'T LAST AND YOU DIDN'T HAVE MONEY TO GET MORE.: NEVER TRUE

## 2025-08-11 SDOH — ECONOMIC STABILITY: FOOD INSECURITY: WITHIN THE PAST 12 MONTHS, YOU WORRIED THAT YOUR FOOD WOULD RUN OUT BEFORE YOU GOT MONEY TO BUY MORE.: NEVER TRUE

## 2025-08-11 ASSESSMENT — PATIENT HEALTH QUESTIONNAIRE - PHQ9
SUM OF ALL RESPONSES TO PHQ QUESTIONS 1-9: 6
SUM OF ALL RESPONSES TO PHQ QUESTIONS 1-9: 6
6. FEELING BAD ABOUT YOURSELF - OR THAT YOU ARE A FAILURE OR HAVE LET YOURSELF OR YOUR FAMILY DOWN: MORE THAN HALF THE DAYS
8. MOVING OR SPEAKING SO SLOWLY THAT OTHER PEOPLE COULD HAVE NOTICED. OR THE OPPOSITE, BEING SO FIGETY OR RESTLESS THAT YOU HAVE BEEN MOVING AROUND A LOT MORE THAN USUAL: NOT AT ALL
9. THOUGHTS THAT YOU WOULD BE BETTER OFF DEAD, OR OF HURTING YOURSELF: NOT AT ALL
7. TROUBLE CONCENTRATING ON THINGS, SUCH AS READING THE NEWSPAPER OR WATCHING TELEVISION: NOT AT ALL
SUM OF ALL RESPONSES TO PHQ QUESTIONS 1-9: 6
SUM OF ALL RESPONSES TO PHQ QUESTIONS 1-9: 6
10. IF YOU CHECKED OFF ANY PROBLEMS, HOW DIFFICULT HAVE THESE PROBLEMS MADE IT FOR YOU TO DO YOUR WORK, TAKE CARE OF THINGS AT HOME, OR GET ALONG WITH OTHER PEOPLE: VERY DIFFICULT
4. FEELING TIRED OR HAVING LITTLE ENERGY: NOT AT ALL
3. TROUBLE FALLING OR STAYING ASLEEP: MORE THAN HALF THE DAYS
2. FEELING DOWN, DEPRESSED OR HOPELESS: MORE THAN HALF THE DAYS
1. LITTLE INTEREST OR PLEASURE IN DOING THINGS: NOT AT ALL
5. POOR APPETITE OR OVEREATING: NOT AT ALL

## 2025-08-12 DIAGNOSIS — Z11.3 SCREENING EXAMINATION FOR STI: ICD-10-CM

## 2025-08-13 LAB
C TRACH DNA UR QL NAA+PROBE: NEGATIVE
HCV AB SERPL QL IA: NORMAL
HIV 1+2 AB+HIV1 P24 AG SERPL QL IA: NORMAL
HIV 2 AB SERPL QL IA: NORMAL
HIV1 AB SERPL QL IA: NORMAL
HIV1 P24 AG SERPL QL IA: NORMAL
N GONORRHOEA DNA UR QL NAA+PROBE: NEGATIVE
REAGIN+T PALLIDUM IGG+IGM SERPL-IMP: NORMAL